# Patient Record
Sex: MALE | Race: OTHER | HISPANIC OR LATINO | Employment: FULL TIME | URBAN - METROPOLITAN AREA
[De-identification: names, ages, dates, MRNs, and addresses within clinical notes are randomized per-mention and may not be internally consistent; named-entity substitution may affect disease eponyms.]

---

## 2017-07-25 ENCOUNTER — ANESTHESIA (INPATIENT)
Dept: PERIOP | Facility: HOSPITAL | Age: 32
DRG: 482 | End: 2017-07-25
Payer: COMMERCIAL

## 2017-07-25 ENCOUNTER — APPOINTMENT (INPATIENT)
Dept: RADIOLOGY | Facility: HOSPITAL | Age: 32
DRG: 482 | End: 2017-07-25
Payer: COMMERCIAL

## 2017-07-25 ENCOUNTER — APPOINTMENT (EMERGENCY)
Dept: RADIOLOGY | Facility: HOSPITAL | Age: 32
DRG: 482 | End: 2017-07-25
Payer: COMMERCIAL

## 2017-07-25 ENCOUNTER — HOSPITAL ENCOUNTER (INPATIENT)
Facility: HOSPITAL | Age: 32
LOS: 2 days | Discharge: HOME | DRG: 482 | End: 2017-07-27
Attending: EMERGENCY MEDICINE | Admitting: ORTHOPAEDIC SURGERY
Payer: COMMERCIAL

## 2017-07-25 ENCOUNTER — ANESTHESIA EVENT (INPATIENT)
Dept: PERIOP | Facility: HOSPITAL | Age: 32
DRG: 482 | End: 2017-07-25
Payer: COMMERCIAL

## 2017-07-25 DIAGNOSIS — S72.001A CLOSED DISPLACED FRACTURE OF RIGHT FEMORAL NECK, INITIAL ENCOUNTER: Primary | ICD-10-CM

## 2017-07-25 PROBLEM — S72.009A FEMORAL NECK FRACTURE (HCC): Status: ACTIVE | Noted: 2017-07-25

## 2017-07-25 LAB
ABO GROUP BLD: NORMAL
ANION GAP SERPL CALCULATED.3IONS-SCNC: 6 MMOL/L (ref 4–13)
APTT PPP: 27 SECONDS (ref 23–35)
BASOPHILS # BLD AUTO: 0.01 THOUSANDS/ΜL (ref 0–0.1)
BASOPHILS NFR BLD AUTO: 0 % (ref 0–1)
BLD GP AB SCN SERPL QL: NEGATIVE
BUN SERPL-MCNC: 8 MG/DL (ref 5–25)
CALCIUM SERPL-MCNC: 8.5 MG/DL (ref 8.3–10.1)
CHLORIDE SERPL-SCNC: 104 MMOL/L (ref 100–108)
CO2 SERPL-SCNC: 24 MMOL/L (ref 21–32)
CREAT SERPL-MCNC: 1.07 MG/DL (ref 0.6–1.3)
EOSINOPHIL # BLD AUTO: 0.21 THOUSAND/ΜL (ref 0–0.61)
EOSINOPHIL NFR BLD AUTO: 2 % (ref 0–6)
ERYTHROCYTE [DISTWIDTH] IN BLOOD BY AUTOMATED COUNT: 13.2 % (ref 11.6–15.1)
GFR SERPL CREATININE-BSD FRML MDRD: 91 ML/MIN/1.73SQ M
GLUCOSE SERPL-MCNC: 141 MG/DL (ref 65–140)
HCT VFR BLD AUTO: 42.5 % (ref 36.5–49.3)
HGB BLD-MCNC: 14.8 G/DL (ref 12–17)
HOLD SPECIMEN: NORMAL
HOLD SPECIMEN: NORMAL
INR PPP: 1.02 (ref 0.86–1.16)
LYMPHOCYTES # BLD AUTO: 2.48 THOUSANDS/ΜL (ref 0.6–4.47)
LYMPHOCYTES NFR BLD AUTO: 27 % (ref 14–44)
MCH RBC QN AUTO: 34.1 PG (ref 26.8–34.3)
MCHC RBC AUTO-ENTMCNC: 34.8 G/DL (ref 31.4–37.4)
MCV RBC AUTO: 98 FL (ref 82–98)
MONOCYTES # BLD AUTO: 0.97 THOUSAND/ΜL (ref 0.17–1.22)
MONOCYTES NFR BLD AUTO: 11 % (ref 4–12)
NEUTROPHILS # BLD AUTO: 5.39 THOUSANDS/ΜL (ref 1.85–7.62)
NEUTS SEG NFR BLD AUTO: 60 % (ref 43–75)
NRBC BLD AUTO-RTO: 0 /100 WBCS
PLATELET # BLD AUTO: 203 THOUSANDS/UL (ref 149–390)
PMV BLD AUTO: 9.8 FL (ref 8.9–12.7)
POTASSIUM SERPL-SCNC: 3.8 MMOL/L (ref 3.5–5.3)
PROTHROMBIN TIME: 13.4 SECONDS (ref 12.1–14.4)
RBC # BLD AUTO: 4.34 MILLION/UL (ref 3.88–5.62)
RH BLD: POSITIVE
SODIUM SERPL-SCNC: 134 MMOL/L (ref 136–145)
SPECIMEN EXPIRATION DATE: NORMAL
WBC # BLD AUTO: 9.07 THOUSAND/UL (ref 4.31–10.16)

## 2017-07-25 PROCEDURE — C1713 ANCHOR/SCREW BN/BN,TIS/BN: HCPCS | Performed by: ORTHOPAEDIC SURGERY

## 2017-07-25 PROCEDURE — C1769 GUIDE WIRE: HCPCS | Performed by: ORTHOPAEDIC SURGERY

## 2017-07-25 PROCEDURE — 96374 THER/PROPH/DIAG INJ IV PUSH: CPT

## 2017-07-25 PROCEDURE — 0QS604Z REPOSITION RIGHT UPPER FEMUR WITH INTERNAL FIXATION DEVICE, OPEN APPROACH: ICD-10-PCS | Performed by: ORTHOPAEDIC SURGERY

## 2017-07-25 PROCEDURE — 96375 TX/PRO/DX INJ NEW DRUG ADDON: CPT

## 2017-07-25 PROCEDURE — 85025 COMPLETE CBC W/AUTO DIFF WBC: CPT | Performed by: EMERGENCY MEDICINE

## 2017-07-25 PROCEDURE — 96376 TX/PRO/DX INJ SAME DRUG ADON: CPT

## 2017-07-25 PROCEDURE — 86901 BLOOD TYPING SEROLOGIC RH(D): CPT | Performed by: EMERGENCY MEDICINE

## 2017-07-25 PROCEDURE — 73502 X-RAY EXAM HIP UNI 2-3 VIEWS: CPT

## 2017-07-25 PROCEDURE — 36415 COLL VENOUS BLD VENIPUNCTURE: CPT | Performed by: EMERGENCY MEDICINE

## 2017-07-25 PROCEDURE — 86900 BLOOD TYPING SEROLOGIC ABO: CPT | Performed by: EMERGENCY MEDICINE

## 2017-07-25 PROCEDURE — 86920 COMPATIBILITY TEST SPIN: CPT

## 2017-07-25 PROCEDURE — 85730 THROMBOPLASTIN TIME PARTIAL: CPT | Performed by: ORTHOPAEDIC SURGERY

## 2017-07-25 PROCEDURE — 73700 CT LOWER EXTREMITY W/O DYE: CPT

## 2017-07-25 PROCEDURE — 99285 EMERGENCY DEPT VISIT HI MDM: CPT

## 2017-07-25 PROCEDURE — 96361 HYDRATE IV INFUSION ADD-ON: CPT

## 2017-07-25 PROCEDURE — 85610 PROTHROMBIN TIME: CPT | Performed by: ORTHOPAEDIC SURGERY

## 2017-07-25 PROCEDURE — 80048 BASIC METABOLIC PNL TOTAL CA: CPT | Performed by: EMERGENCY MEDICINE

## 2017-07-25 PROCEDURE — 86850 RBC ANTIBODY SCREEN: CPT | Performed by: EMERGENCY MEDICINE

## 2017-07-25 PROCEDURE — 73560 X-RAY EXAM OF KNEE 1 OR 2: CPT

## 2017-07-25 DEVICE — 4.5MM CORTEX SCREW SELF-TAPPING 42MM: Type: IMPLANTABLE DEVICE | Status: FUNCTIONAL

## 2017-07-25 DEVICE — 130 DEG DHS PLATE-STD BARREL 2 HOLES/46MM-STERILE
Type: IMPLANTABLE DEVICE | Status: FUNCTIONAL
Brand: DHS

## 2017-07-25 DEVICE — DHS®/DCS® ONE-STEP LAG SCREW 12.7MM THREAD/95MM-STERILE
Type: IMPLANTABLE DEVICE | Status: FUNCTIONAL
Brand: DHS

## 2017-07-25 DEVICE — 6.5MM CANNULATED SCREW 16MM THREAD 85MM
Type: IMPLANTABLE DEVICE | Status: NON-FUNCTIONAL
Removed: 2018-01-22

## 2017-07-25 RX ORDER — SODIUM CHLORIDE, SODIUM LACTATE, POTASSIUM CHLORIDE, CALCIUM CHLORIDE 600; 310; 30; 20 MG/100ML; MG/100ML; MG/100ML; MG/100ML
INJECTION, SOLUTION INTRAVENOUS CONTINUOUS PRN
Status: DISCONTINUED | OUTPATIENT
Start: 2017-07-25 | End: 2017-07-25 | Stop reason: SURG

## 2017-07-25 RX ORDER — PROPOFOL 10 MG/ML
INJECTION, EMULSION INTRAVENOUS AS NEEDED
Status: DISCONTINUED | OUTPATIENT
Start: 2017-07-25 | End: 2017-07-25 | Stop reason: SURG

## 2017-07-25 RX ORDER — DIAZEPAM 5 MG/ML
2.5 INJECTION, SOLUTION INTRAMUSCULAR; INTRAVENOUS ONCE
Status: COMPLETED | OUTPATIENT
Start: 2017-07-25 | End: 2017-07-25

## 2017-07-25 RX ORDER — ONDANSETRON 2 MG/ML
4 INJECTION INTRAMUSCULAR; INTRAVENOUS EVERY 6 HOURS PRN
Status: DISCONTINUED | OUTPATIENT
Start: 2017-07-25 | End: 2017-07-27 | Stop reason: HOSPADM

## 2017-07-25 RX ORDER — OXYCODONE HYDROCHLORIDE 5 MG/1
5 TABLET ORAL EVERY 4 HOURS PRN
Status: DISCONTINUED | OUTPATIENT
Start: 2017-07-25 | End: 2017-07-26

## 2017-07-25 RX ORDER — CLONAZEPAM 0.5 MG/1
0.5 TABLET ORAL DAILY
Status: DISCONTINUED | OUTPATIENT
Start: 2017-07-26 | End: 2017-07-27 | Stop reason: HOSPADM

## 2017-07-25 RX ORDER — MAGNESIUM HYDROXIDE 1200 MG/15ML
LIQUID ORAL AS NEEDED
Status: DISCONTINUED | OUTPATIENT
Start: 2017-07-25 | End: 2017-07-25 | Stop reason: HOSPADM

## 2017-07-25 RX ORDER — ONDANSETRON 2 MG/ML
INJECTION INTRAMUSCULAR; INTRAVENOUS AS NEEDED
Status: DISCONTINUED | OUTPATIENT
Start: 2017-07-25 | End: 2017-07-25 | Stop reason: SURG

## 2017-07-25 RX ORDER — SENNOSIDES 8.6 MG
1 TABLET ORAL DAILY
Status: DISCONTINUED | OUTPATIENT
Start: 2017-07-26 | End: 2017-07-27 | Stop reason: HOSPADM

## 2017-07-25 RX ORDER — CITALOPRAM 40 MG/1
40 TABLET ORAL DAILY
COMMUNITY
End: 2018-01-25

## 2017-07-25 RX ORDER — ACETAMINOPHEN 325 MG/1
650 TABLET ORAL EVERY 4 HOURS PRN
Status: DISCONTINUED | OUTPATIENT
Start: 2017-07-25 | End: 2017-07-26

## 2017-07-25 RX ORDER — ONDANSETRON 2 MG/ML
4 INJECTION INTRAMUSCULAR; INTRAVENOUS ONCE AS NEEDED
Status: DISCONTINUED | OUTPATIENT
Start: 2017-07-25 | End: 2017-07-25 | Stop reason: HOSPADM

## 2017-07-25 RX ORDER — MIDAZOLAM HYDROCHLORIDE 1 MG/ML
INJECTION INTRAMUSCULAR; INTRAVENOUS AS NEEDED
Status: DISCONTINUED | OUTPATIENT
Start: 2017-07-25 | End: 2017-07-25 | Stop reason: SURG

## 2017-07-25 RX ORDER — ROCURONIUM BROMIDE 10 MG/ML
INJECTION, SOLUTION INTRAVENOUS AS NEEDED
Status: DISCONTINUED | OUTPATIENT
Start: 2017-07-25 | End: 2017-07-25 | Stop reason: SURG

## 2017-07-25 RX ORDER — FENTANYL CITRATE 50 UG/ML
INJECTION, SOLUTION INTRAMUSCULAR; INTRAVENOUS AS NEEDED
Status: DISCONTINUED | OUTPATIENT
Start: 2017-07-25 | End: 2017-07-25 | Stop reason: SURG

## 2017-07-25 RX ORDER — OXYCODONE HYDROCHLORIDE 10 MG/1
10 TABLET ORAL EVERY 4 HOURS PRN
Status: DISCONTINUED | OUTPATIENT
Start: 2017-07-25 | End: 2017-07-26

## 2017-07-25 RX ORDER — LIDOCAINE HYDROCHLORIDE 10 MG/ML
INJECTION, SOLUTION INFILTRATION; PERINEURAL AS NEEDED
Status: DISCONTINUED | OUTPATIENT
Start: 2017-07-25 | End: 2017-07-25 | Stop reason: SURG

## 2017-07-25 RX ORDER — FENTANYL CITRATE 50 UG/ML
100 INJECTION, SOLUTION INTRAMUSCULAR; INTRAVENOUS ONCE
Status: COMPLETED | OUTPATIENT
Start: 2017-07-25 | End: 2017-07-25

## 2017-07-25 RX ORDER — SODIUM CHLORIDE, SODIUM LACTATE, POTASSIUM CHLORIDE, CALCIUM CHLORIDE 600; 310; 30; 20 MG/100ML; MG/100ML; MG/100ML; MG/100ML
100 INJECTION, SOLUTION INTRAVENOUS CONTINUOUS
Status: DISCONTINUED | OUTPATIENT
Start: 2017-07-25 | End: 2017-07-27 | Stop reason: HOSPADM

## 2017-07-25 RX ORDER — DOCUSATE SODIUM 100 MG/1
100 CAPSULE, LIQUID FILLED ORAL 2 TIMES DAILY
Status: DISCONTINUED | OUTPATIENT
Start: 2017-07-25 | End: 2017-07-27 | Stop reason: HOSPADM

## 2017-07-25 RX ORDER — CITALOPRAM 20 MG/1
40 TABLET ORAL DAILY
Status: DISCONTINUED | OUTPATIENT
Start: 2017-07-26 | End: 2017-07-27 | Stop reason: HOSPADM

## 2017-07-25 RX ADMIN — SODIUM CHLORIDE 1000 ML: 0.9 INJECTION, SOLUTION INTRAVENOUS at 11:09

## 2017-07-25 RX ADMIN — OXYCODONE HYDROCHLORIDE 10 MG: 10 TABLET ORAL at 17:39

## 2017-07-25 RX ADMIN — ROCURONIUM BROMIDE 30 MG: 10 INJECTION, SOLUTION INTRAVENOUS at 18:33

## 2017-07-25 RX ADMIN — FENTANYL CITRATE 50 MCG: 50 INJECTION, SOLUTION INTRAMUSCULAR; INTRAVENOUS at 19:11

## 2017-07-25 RX ADMIN — FENTANYL CITRATE 50 MCG: 50 INJECTION, SOLUTION INTRAMUSCULAR; INTRAVENOUS at 20:22

## 2017-07-25 RX ADMIN — SODIUM CHLORIDE, SODIUM LACTATE, POTASSIUM CHLORIDE, AND CALCIUM CHLORIDE: .6; .31; .03; .02 INJECTION, SOLUTION INTRAVENOUS at 18:26

## 2017-07-25 RX ADMIN — LIDOCAINE HYDROCHLORIDE 100 MG: 10 INJECTION, SOLUTION INFILTRATION; PERINEURAL at 18:33

## 2017-07-25 RX ADMIN — HYDROMORPHONE HYDROCHLORIDE 0.5 MG: 1 INJECTION, SOLUTION INTRAMUSCULAR; INTRAVENOUS; SUBCUTANEOUS at 12:50

## 2017-07-25 RX ADMIN — ONDANSETRON 4 MG: 2 INJECTION INTRAMUSCULAR; INTRAVENOUS at 20:59

## 2017-07-25 RX ADMIN — HYDROMORPHONE HYDROCHLORIDE 0.2 MG: 1 INJECTION, SOLUTION INTRAMUSCULAR; INTRAVENOUS; SUBCUTANEOUS at 22:29

## 2017-07-25 RX ADMIN — HYDROMORPHONE HYDROCHLORIDE 0.2 MG: 1 INJECTION, SOLUTION INTRAMUSCULAR; INTRAVENOUS; SUBCUTANEOUS at 22:04

## 2017-07-25 RX ADMIN — SODIUM CHLORIDE, SODIUM LACTATE, POTASSIUM CHLORIDE, AND CALCIUM CHLORIDE 100 ML/HR: .6; .31; .03; .02 INJECTION, SOLUTION INTRAVENOUS at 17:01

## 2017-07-25 RX ADMIN — HYDROMORPHONE HYDROCHLORIDE 0.5 MG: 1 INJECTION, SOLUTION INTRAMUSCULAR; INTRAVENOUS; SUBCUTANEOUS at 22:32

## 2017-07-25 RX ADMIN — FENTANYL CITRATE 50 MCG: 50 INJECTION, SOLUTION INTRAMUSCULAR; INTRAVENOUS at 21:17

## 2017-07-25 RX ADMIN — HYDROMORPHONE HYDROCHLORIDE 0.5 MG: 1 INJECTION, SOLUTION INTRAMUSCULAR; INTRAVENOUS; SUBCUTANEOUS at 11:51

## 2017-07-25 RX ADMIN — HYDROMORPHONE HYDROCHLORIDE 0.2 MG: 1 INJECTION, SOLUTION INTRAMUSCULAR; INTRAVENOUS; SUBCUTANEOUS at 22:17

## 2017-07-25 RX ADMIN — PROPOFOL 200 MG: 10 INJECTION, EMULSION INTRAVENOUS at 18:33

## 2017-07-25 RX ADMIN — HYDROMORPHONE HYDROCHLORIDE 0.2 MG: 1 INJECTION, SOLUTION INTRAMUSCULAR; INTRAVENOUS; SUBCUTANEOUS at 22:23

## 2017-07-25 RX ADMIN — HYDROMORPHONE HYDROCHLORIDE 1 MG: 1 INJECTION, SOLUTION INTRAMUSCULAR; INTRAVENOUS; SUBCUTANEOUS at 11:07

## 2017-07-25 RX ADMIN — MIDAZOLAM HYDROCHLORIDE 2 MG: 1 INJECTION, SOLUTION INTRAMUSCULAR; INTRAVENOUS at 18:26

## 2017-07-25 RX ADMIN — FENTANYL CITRATE 100 MCG: 50 INJECTION, SOLUTION INTRAMUSCULAR; INTRAVENOUS at 18:33

## 2017-07-25 RX ADMIN — ROCURONIUM BROMIDE 20 MG: 10 INJECTION, SOLUTION INTRAVENOUS at 19:11

## 2017-07-25 RX ADMIN — FENTANYL CITRATE 100 MCG: 50 INJECTION INTRAMUSCULAR; INTRAVENOUS at 15:18

## 2017-07-25 RX ADMIN — SODIUM CHLORIDE, SODIUM LACTATE, POTASSIUM CHLORIDE, AND CALCIUM CHLORIDE: .6; .31; .03; .02 INJECTION, SOLUTION INTRAVENOUS at 19:51

## 2017-07-25 RX ADMIN — DIAZEPAM 2.5 MG: 5 INJECTION, SOLUTION INTRAMUSCULAR; INTRAVENOUS at 15:16

## 2017-07-25 RX ADMIN — HYDROMORPHONE HYDROCHLORIDE 0.2 MG: 1 INJECTION, SOLUTION INTRAMUSCULAR; INTRAVENOUS; SUBCUTANEOUS at 22:12

## 2017-07-25 RX ADMIN — CEFAZOLIN SODIUM 2000 MG: 1 SOLUTION INTRAVENOUS at 18:40

## 2017-07-25 RX ADMIN — DIAZEPAM 2.5 MG: 5 INJECTION, SOLUTION INTRAMUSCULAR; INTRAVENOUS at 12:48

## 2017-07-25 RX ADMIN — SODIUM CHLORIDE, SODIUM LACTATE, POTASSIUM CHLORIDE, AND CALCIUM CHLORIDE 100 ML/HR: .6; .31; .03; .02 INJECTION, SOLUTION INTRAVENOUS at 22:24

## 2017-07-26 LAB
ANION GAP SERPL CALCULATED.3IONS-SCNC: 5 MMOL/L (ref 4–13)
BUN SERPL-MCNC: 7 MG/DL (ref 5–25)
CALCIUM SERPL-MCNC: 8.6 MG/DL (ref 8.3–10.1)
CHLORIDE SERPL-SCNC: 103 MMOL/L (ref 100–108)
CO2 SERPL-SCNC: 27 MMOL/L (ref 21–32)
CREAT SERPL-MCNC: 0.87 MG/DL (ref 0.6–1.3)
ERYTHROCYTE [DISTWIDTH] IN BLOOD BY AUTOMATED COUNT: 13.1 % (ref 11.6–15.1)
GFR SERPL CREATININE-BSD FRML MDRD: 114 ML/MIN/1.73SQ M
GLUCOSE SERPL-MCNC: 108 MG/DL (ref 65–140)
HCT VFR BLD AUTO: 39.7 % (ref 36.5–49.3)
HGB BLD-MCNC: 13.8 G/DL (ref 12–17)
MCH RBC QN AUTO: 33.8 PG (ref 26.8–34.3)
MCHC RBC AUTO-ENTMCNC: 34.8 G/DL (ref 31.4–37.4)
MCV RBC AUTO: 97 FL (ref 82–98)
PLATELET # BLD AUTO: 195 THOUSANDS/UL (ref 149–390)
PMV BLD AUTO: 9.8 FL (ref 8.9–12.7)
POTASSIUM SERPL-SCNC: 4.2 MMOL/L (ref 3.5–5.3)
RBC # BLD AUTO: 4.08 MILLION/UL (ref 3.88–5.62)
SODIUM SERPL-SCNC: 135 MMOL/L (ref 136–145)
WBC # BLD AUTO: 9.19 THOUSAND/UL (ref 4.31–10.16)

## 2017-07-26 PROCEDURE — G8978 MOBILITY CURRENT STATUS: HCPCS

## 2017-07-26 PROCEDURE — 97167 OT EVAL HIGH COMPLEX 60 MIN: CPT

## 2017-07-26 PROCEDURE — 80048 BASIC METABOLIC PNL TOTAL CA: CPT | Performed by: ORTHOPAEDIC SURGERY

## 2017-07-26 PROCEDURE — G8988 SELF CARE GOAL STATUS: HCPCS

## 2017-07-26 PROCEDURE — G8987 SELF CARE CURRENT STATUS: HCPCS

## 2017-07-26 PROCEDURE — 97163 PT EVAL HIGH COMPLEX 45 MIN: CPT

## 2017-07-26 PROCEDURE — G8989 SELF CARE D/C STATUS: HCPCS

## 2017-07-26 PROCEDURE — 85027 COMPLETE CBC AUTOMATED: CPT | Performed by: ORTHOPAEDIC SURGERY

## 2017-07-26 PROCEDURE — G8980 MOBILITY D/C STATUS: HCPCS

## 2017-07-26 PROCEDURE — G8979 MOBILITY GOAL STATUS: HCPCS

## 2017-07-26 RX ORDER — METHOCARBAMOL 500 MG/1
500 TABLET, FILM COATED ORAL EVERY 6 HOURS
Status: DISCONTINUED | OUTPATIENT
Start: 2017-07-26 | End: 2017-07-27 | Stop reason: HOSPADM

## 2017-07-26 RX ORDER — OXYCODONE HYDROCHLORIDE 10 MG/1
10 TABLET ORAL EVERY 4 HOURS PRN
Status: DISCONTINUED | OUTPATIENT
Start: 2017-07-26 | End: 2017-07-27

## 2017-07-26 RX ORDER — ACETAMINOPHEN 325 MG/1
975 TABLET ORAL EVERY 6 HOURS SCHEDULED
Status: DISCONTINUED | OUTPATIENT
Start: 2017-07-26 | End: 2017-07-27

## 2017-07-26 RX ORDER — GABAPENTIN 100 MG/1
100 CAPSULE ORAL 3 TIMES DAILY
Status: DISCONTINUED | OUTPATIENT
Start: 2017-07-26 | End: 2017-07-27 | Stop reason: HOSPADM

## 2017-07-26 RX ORDER — ACETAMINOPHEN 325 MG/1
975 TABLET ORAL EVERY 4 HOURS
Status: DISCONTINUED | OUTPATIENT
Start: 2017-07-26 | End: 2017-07-26

## 2017-07-26 RX ORDER — OXYCODONE HYDROCHLORIDE 5 MG/1
5 TABLET ORAL EVERY 4 HOURS PRN
Qty: 30 TABLET | Refills: 0
Start: 2017-07-26 | End: 2017-07-27 | Stop reason: HOSPADM

## 2017-07-26 RX ADMIN — HYDROMORPHONE HYDROCHLORIDE 1 MG: 1 INJECTION, SOLUTION INTRAMUSCULAR; INTRAVENOUS; SUBCUTANEOUS at 15:23

## 2017-07-26 RX ADMIN — OXYCODONE HYDROCHLORIDE 5 MG: 5 TABLET ORAL at 03:12

## 2017-07-26 RX ADMIN — CITALOPRAM HYDROBROMIDE 40 MG: 20 TABLET ORAL at 08:11

## 2017-07-26 RX ADMIN — HYDROMORPHONE HYDROCHLORIDE 1 MG: 1 INJECTION, SOLUTION INTRAMUSCULAR; INTRAVENOUS; SUBCUTANEOUS at 19:46

## 2017-07-26 RX ADMIN — CLONAZEPAM 0.5 MG: 0.5 TABLET ORAL at 08:11

## 2017-07-26 RX ADMIN — SENNOSIDES 8.6 MG: 8.6 TABLET, FILM COATED ORAL at 08:11

## 2017-07-26 RX ADMIN — HYDROMORPHONE HYDROCHLORIDE 1 MG: 1 INJECTION, SOLUTION INTRAMUSCULAR; INTRAVENOUS; SUBCUTANEOUS at 13:02

## 2017-07-26 RX ADMIN — ACETAMINOPHEN 650 MG: 325 TABLET, FILM COATED ORAL at 01:35

## 2017-07-26 RX ADMIN — ACETAMINOPHEN 975 MG: 325 TABLET, FILM COATED ORAL at 23:50

## 2017-07-26 RX ADMIN — METHOCARBAMOL 500 MG: 500 TABLET ORAL at 15:23

## 2017-07-26 RX ADMIN — ACETAMINOPHEN 650 MG: 325 TABLET, FILM COATED ORAL at 08:27

## 2017-07-26 RX ADMIN — DOCUSATE SODIUM 100 MG: 100 CAPSULE, LIQUID FILLED ORAL at 18:12

## 2017-07-26 RX ADMIN — HYDROMORPHONE HYDROCHLORIDE 1 MG: 1 INJECTION, SOLUTION INTRAMUSCULAR; INTRAVENOUS; SUBCUTANEOUS at 23:51

## 2017-07-26 RX ADMIN — DOCUSATE SODIUM 100 MG: 100 CAPSULE, LIQUID FILLED ORAL at 08:11

## 2017-07-26 RX ADMIN — CEFAZOLIN SODIUM 1000 MG: 1 SOLUTION INTRAVENOUS at 00:49

## 2017-07-26 RX ADMIN — OXYCODONE HYDROCHLORIDE 15 MG: 5 TABLET ORAL at 11:50

## 2017-07-26 RX ADMIN — NICOTINE 1 PATCH: 7 PATCH, EXTENDED RELEASE TRANSDERMAL at 08:10

## 2017-07-26 RX ADMIN — ENOXAPARIN SODIUM 40 MG: 40 INJECTION SUBCUTANEOUS at 08:11

## 2017-07-26 RX ADMIN — OXYCODONE HYDROCHLORIDE 15 MG: 5 TABLET ORAL at 18:12

## 2017-07-26 RX ADMIN — GABAPENTIN 100 MG: 100 CAPSULE ORAL at 22:06

## 2017-07-26 RX ADMIN — HYDROMORPHONE HYDROCHLORIDE 0.5 MG: 1 INJECTION, SOLUTION INTRAMUSCULAR; INTRAVENOUS; SUBCUTANEOUS at 08:25

## 2017-07-26 RX ADMIN — HYDROMORPHONE HYDROCHLORIDE 0.5 MG: 1 INJECTION, SOLUTION INTRAMUSCULAR; INTRAVENOUS; SUBCUTANEOUS at 05:26

## 2017-07-26 RX ADMIN — CEFAZOLIN SODIUM 1000 MG: 1 SOLUTION INTRAVENOUS at 07:33

## 2017-07-26 RX ADMIN — ACETAMINOPHEN 975 MG: 325 TABLET, FILM COATED ORAL at 15:24

## 2017-07-26 RX ADMIN — HYDROMORPHONE HYDROCHLORIDE 1 MG: 1 INJECTION, SOLUTION INTRAMUSCULAR; INTRAVENOUS; SUBCUTANEOUS at 09:50

## 2017-07-26 RX ADMIN — GABAPENTIN 100 MG: 100 CAPSULE ORAL at 15:23

## 2017-07-26 RX ADMIN — OXYCODONE HYDROCHLORIDE 10 MG: 10 TABLET ORAL at 07:40

## 2017-07-26 RX ADMIN — OXYCODONE HYDROCHLORIDE 10 MG: 10 TABLET ORAL at 22:07

## 2017-07-26 RX ADMIN — HYDROMORPHONE HYDROCHLORIDE 0.5 MG: 1 INJECTION, SOLUTION INTRAMUSCULAR; INTRAVENOUS; SUBCUTANEOUS at 01:12

## 2017-07-26 RX ADMIN — METHOCARBAMOL 500 MG: 500 TABLET ORAL at 22:06

## 2017-07-27 ENCOUNTER — GENERIC CONVERSION - ENCOUNTER (OUTPATIENT)
Dept: OTHER | Facility: OTHER | Age: 32
End: 2017-07-27

## 2017-07-27 VITALS
OXYGEN SATURATION: 96 % | RESPIRATION RATE: 18 BRPM | TEMPERATURE: 98.3 F | WEIGHT: 200 LBS | HEIGHT: 67 IN | SYSTOLIC BLOOD PRESSURE: 107 MMHG | HEART RATE: 80 BPM | BODY MASS INDEX: 31.39 KG/M2 | DIASTOLIC BLOOD PRESSURE: 59 MMHG

## 2017-07-27 LAB
ANION GAP SERPL CALCULATED.3IONS-SCNC: 6 MMOL/L (ref 4–13)
BUN SERPL-MCNC: 6 MG/DL (ref 5–25)
CALCIUM SERPL-MCNC: 8.5 MG/DL (ref 8.3–10.1)
CHLORIDE SERPL-SCNC: 102 MMOL/L (ref 100–108)
CO2 SERPL-SCNC: 27 MMOL/L (ref 21–32)
CREAT SERPL-MCNC: 0.87 MG/DL (ref 0.6–1.3)
ERYTHROCYTE [DISTWIDTH] IN BLOOD BY AUTOMATED COUNT: 13.4 % (ref 11.6–15.1)
GFR SERPL CREATININE-BSD FRML MDRD: 114 ML/MIN/1.73SQ M
GLUCOSE SERPL-MCNC: 128 MG/DL (ref 65–140)
HCT VFR BLD AUTO: 38.2 % (ref 36.5–49.3)
HGB BLD-MCNC: 12.9 G/DL (ref 12–17)
MCH RBC QN AUTO: 33.2 PG (ref 26.8–34.3)
MCHC RBC AUTO-ENTMCNC: 33.8 G/DL (ref 31.4–37.4)
MCV RBC AUTO: 99 FL (ref 82–98)
PLATELET # BLD AUTO: 174 THOUSANDS/UL (ref 149–390)
PMV BLD AUTO: 9.9 FL (ref 8.9–12.7)
POTASSIUM SERPL-SCNC: 3.5 MMOL/L (ref 3.5–5.3)
RBC # BLD AUTO: 3.88 MILLION/UL (ref 3.88–5.62)
SODIUM SERPL-SCNC: 135 MMOL/L (ref 136–145)
WBC # BLD AUTO: 9.04 THOUSAND/UL (ref 4.31–10.16)

## 2017-07-27 PROCEDURE — 85027 COMPLETE CBC AUTOMATED: CPT | Performed by: ORTHOPAEDIC SURGERY

## 2017-07-27 PROCEDURE — 80048 BASIC METABOLIC PNL TOTAL CA: CPT | Performed by: ORTHOPAEDIC SURGERY

## 2017-07-27 RX ORDER — ACETAMINOPHEN 325 MG/1
TABLET ORAL
Qty: 30 TABLET | Refills: 0
Start: 2017-07-27 | End: 2017-07-27 | Stop reason: HOSPADM

## 2017-07-27 RX ORDER — ACETAMINOPHEN 325 MG/1
975 TABLET ORAL EVERY 6 HOURS SCHEDULED
Status: DISCONTINUED | OUTPATIENT
Start: 2017-07-27 | End: 2017-07-27 | Stop reason: HOSPADM

## 2017-07-27 RX ORDER — ACETAMINOPHEN 325 MG/1
TABLET ORAL
Qty: 30 TABLET | Refills: 0
Start: 2017-07-27 | End: 2017-07-27

## 2017-07-27 RX ORDER — CEPHALEXIN 500 MG/1
500 CAPSULE ORAL EVERY 6 HOURS SCHEDULED
Status: DISCONTINUED | OUTPATIENT
Start: 2017-07-27 | End: 2017-07-27 | Stop reason: HOSPADM

## 2017-07-27 RX ORDER — TRAMADOL HYDROCHLORIDE 50 MG/1
50 TABLET ORAL EVERY 6 HOURS PRN
Qty: 30 TABLET | Refills: 0
Start: 2017-07-27 | End: 2017-07-27 | Stop reason: HOSPADM

## 2017-07-27 RX ORDER — CEPHALEXIN 500 MG/1
500 CAPSULE ORAL EVERY 6 HOURS SCHEDULED
Qty: 20 CAPSULE | Refills: 0 | Status: SHIPPED | OUTPATIENT
Start: 2017-07-27 | End: 2017-07-27

## 2017-07-27 RX ORDER — METHOCARBAMOL 500 MG/1
500 TABLET, FILM COATED ORAL EVERY 6 HOURS
Refills: 0 | Status: ON HOLD
Start: 2017-07-27 | End: 2018-01-22

## 2017-07-27 RX ORDER — TRAMADOL HYDROCHLORIDE 50 MG/1
50 TABLET ORAL EVERY 6 HOURS PRN
Status: DISCONTINUED | OUTPATIENT
Start: 2017-07-27 | End: 2017-07-27 | Stop reason: HOSPADM

## 2017-07-27 RX ORDER — CEPHALEXIN 500 MG/1
500 CAPSULE ORAL EVERY 6 HOURS SCHEDULED
Qty: 20 CAPSULE | Refills: 0
Start: 2017-07-27 | End: 2017-08-01

## 2017-07-27 RX ORDER — POLYETHYLENE GLYCOL 3350 17 G/17G
17 POWDER, FOR SOLUTION ORAL DAILY
Status: DISCONTINUED | OUTPATIENT
Start: 2017-07-27 | End: 2017-07-27 | Stop reason: HOSPADM

## 2017-07-27 RX ADMIN — ENOXAPARIN SODIUM 40 MG: 40 INJECTION SUBCUTANEOUS at 08:16

## 2017-07-27 RX ADMIN — ACETAMINOPHEN 975 MG: 325 TABLET, FILM COATED ORAL at 05:34

## 2017-07-27 RX ADMIN — CEPHALEXIN 500 MG: 500 CAPSULE ORAL at 12:39

## 2017-07-27 RX ADMIN — GABAPENTIN 100 MG: 100 CAPSULE ORAL at 08:16

## 2017-07-27 RX ADMIN — METHOCARBAMOL 500 MG: 500 TABLET ORAL at 04:39

## 2017-07-27 RX ADMIN — OXYCODONE HYDROCHLORIDE 15 MG: 5 TABLET ORAL at 08:17

## 2017-07-27 RX ADMIN — SENNOSIDES 8.6 MG: 8.6 TABLET, FILM COATED ORAL at 08:16

## 2017-07-27 RX ADMIN — HYDROMORPHONE HYDROCHLORIDE 1 MG: 1 INJECTION, SOLUTION INTRAMUSCULAR; INTRAVENOUS; SUBCUTANEOUS at 13:54

## 2017-07-27 RX ADMIN — CLONAZEPAM 0.5 MG: 0.5 TABLET ORAL at 08:17

## 2017-07-27 RX ADMIN — OXYCODONE HYDROCHLORIDE 15 MG: 5 TABLET ORAL at 03:33

## 2017-07-27 RX ADMIN — NICOTINE 1 PATCH: 7 PATCH, EXTENDED RELEASE TRANSDERMAL at 08:16

## 2017-07-27 RX ADMIN — METHOCARBAMOL 500 MG: 500 TABLET ORAL at 10:45

## 2017-07-27 RX ADMIN — HYDROMORPHONE HYDROCHLORIDE 1 MG: 1 INJECTION, SOLUTION INTRAMUSCULAR; INTRAVENOUS; SUBCUTANEOUS at 04:39

## 2017-07-27 RX ADMIN — POLYETHYLENE GLYCOL 3350 17 G: 17 POWDER, FOR SOLUTION ORAL at 10:45

## 2017-07-27 RX ADMIN — DOCUSATE SODIUM 100 MG: 100 CAPSULE, LIQUID FILLED ORAL at 08:16

## 2017-07-27 RX ADMIN — CITALOPRAM HYDROBROMIDE 40 MG: 20 TABLET ORAL at 08:16

## 2017-07-27 RX ADMIN — HYDROMORPHONE HYDROCHLORIDE 1 MG: 1 INJECTION, SOLUTION INTRAMUSCULAR; INTRAVENOUS; SUBCUTANEOUS at 10:45

## 2017-07-28 LAB
ABO GROUP BLD BPU: NORMAL
ABO GROUP BLD BPU: NORMAL
BPU ID: NORMAL
BPU ID: NORMAL
CROSSMATCH: NORMAL
CROSSMATCH: NORMAL
UNIT DISPENSE STATUS: NORMAL
UNIT DISPENSE STATUS: NORMAL
UNIT PRODUCT CODE: NORMAL
UNIT PRODUCT CODE: NORMAL
UNIT RH: NORMAL
UNIT RH: NORMAL

## 2017-08-03 ENCOUNTER — HOSPITAL ENCOUNTER (OUTPATIENT)
Dept: RADIOLOGY | Facility: HOSPITAL | Age: 32
Discharge: HOME | End: 2017-08-03
Attending: ORTHOPAEDIC SURGERY
Payer: COMMERCIAL

## 2017-08-03 ENCOUNTER — ALLSCRIPTS OFFICE VISIT (OUTPATIENT)
Dept: OTHER | Facility: OTHER | Age: 32
End: 2017-08-03

## 2017-08-03 ENCOUNTER — GENERIC CONVERSION - ENCOUNTER (OUTPATIENT)
Dept: OTHER | Facility: OTHER | Age: 32
End: 2017-08-03

## 2017-08-03 DIAGNOSIS — S72.001D CLOSED FRACTURE OF NECK OF RIGHT FEMUR WITH ROUTINE HEALING: ICD-10-CM

## 2017-08-03 PROCEDURE — 73502 X-RAY EXAM HIP UNI 2-3 VIEWS: CPT

## 2017-08-31 ENCOUNTER — HOSPITAL ENCOUNTER (OUTPATIENT)
Dept: RADIOLOGY | Facility: HOSPITAL | Age: 32
Discharge: HOME | End: 2017-08-31
Attending: ORTHOPAEDIC SURGERY
Payer: COMMERCIAL

## 2017-08-31 ENCOUNTER — GENERIC CONVERSION - ENCOUNTER (OUTPATIENT)
Dept: OTHER | Facility: OTHER | Age: 32
End: 2017-08-31

## 2017-08-31 DIAGNOSIS — S72.001D CLOSED FRACTURE OF NECK OF RIGHT FEMUR WITH ROUTINE HEALING: ICD-10-CM

## 2017-08-31 PROCEDURE — 73502 X-RAY EXAM HIP UNI 2-3 VIEWS: CPT

## 2017-09-21 ENCOUNTER — ALLSCRIPTS OFFICE VISIT (OUTPATIENT)
Dept: OTHER | Facility: OTHER | Age: 32
End: 2017-09-21

## 2017-09-21 ENCOUNTER — GENERIC CONVERSION - ENCOUNTER (OUTPATIENT)
Dept: OTHER | Facility: OTHER | Age: 32
End: 2017-09-21

## 2017-09-21 ENCOUNTER — HOSPITAL ENCOUNTER (OUTPATIENT)
Dept: RADIOLOGY | Facility: HOSPITAL | Age: 32
Discharge: HOME | End: 2017-09-21
Attending: ORTHOPAEDIC SURGERY
Payer: COMMERCIAL

## 2017-09-21 DIAGNOSIS — S72.001D CLOSED FRACTURE OF NECK OF RIGHT FEMUR WITH ROUTINE HEALING: ICD-10-CM

## 2017-09-21 DIAGNOSIS — Z48.89 ENCOUNTER FOR OTHER SPECIFIED SURGICAL AFTERCARE (CODE): ICD-10-CM

## 2017-09-21 PROCEDURE — 73502 X-RAY EXAM HIP UNI 2-3 VIEWS: CPT

## 2017-10-06 ENCOUNTER — GENERIC CONVERSION - ENCOUNTER (OUTPATIENT)
Dept: OTHER | Facility: OTHER | Age: 32
End: 2017-10-06

## 2017-10-26 ENCOUNTER — HOSPITAL ENCOUNTER (OUTPATIENT)
Dept: RADIOLOGY | Facility: HOSPITAL | Age: 32
Discharge: HOME | End: 2017-10-26
Attending: ORTHOPAEDIC SURGERY
Payer: COMMERCIAL

## 2017-10-26 ENCOUNTER — ALLSCRIPTS OFFICE VISIT (OUTPATIENT)
Dept: OTHER | Facility: OTHER | Age: 32
End: 2017-10-26

## 2017-10-26 DIAGNOSIS — S72.001D CLOSED FRACTURE OF NECK OF RIGHT FEMUR WITH ROUTINE HEALING: ICD-10-CM

## 2017-10-26 PROCEDURE — 73502 X-RAY EXAM HIP UNI 2-3 VIEWS: CPT

## 2017-12-07 ENCOUNTER — ALLSCRIPTS OFFICE VISIT (OUTPATIENT)
Dept: OTHER | Facility: OTHER | Age: 32
End: 2017-12-07

## 2017-12-07 ENCOUNTER — HOSPITAL ENCOUNTER (OUTPATIENT)
Dept: RADIOLOGY | Facility: HOSPITAL | Age: 32
Discharge: HOME | End: 2017-12-07
Attending: ORTHOPAEDIC SURGERY
Payer: COMMERCIAL

## 2017-12-07 DIAGNOSIS — S72.001D CLOSED FRACTURE OF NECK OF RIGHT FEMUR WITH ROUTINE HEALING: ICD-10-CM

## 2017-12-07 DIAGNOSIS — Z48.89 ENCOUNTER FOR OTHER SPECIFIED SURGICAL AFTERCARE (CODE): ICD-10-CM

## 2017-12-07 PROCEDURE — 73502 X-RAY EXAM HIP UNI 2-3 VIEWS: CPT

## 2017-12-08 NOTE — PROGRESS NOTES
Assessment   1  Aftercare following surgery for injury or trauma (V58 43) (Z48 89)    Plan   Aftercare following surgery for injury or trauma    · Physical Therapy Referral Other Co-Management  *This patient has a severe displaced    right femoral neck fracture secondary to stress fracture  He is improving  Range    of motion and Will's, while decreasing pain through physical therapy  He still needs    improvement in strength and motion and will require continued treatment for another 4-8    weeks  Status: Active  Requested for: 32Xls2315  YOU are Referring to a non- Preferred Provider : Established Patient   (MU) Care Summary provided  : Yes   · Follow-up visit in 6 weeks Evaluation and Treatment  Follow-up  Status: Hold For -    Scheduling  Requested for: 52NTV5670  Closed fracture of right hip requiring operative repair, with routine healing, subsequent    encounter    · * XR HIP/PELV 2-3 VWS RIGHT W PELVIS IF PERFORMED; Status:Active -    Retrospective By Protocol Authorization; Requested for:13Sfv3521;     Chief Complaint   1  Hip Pain    History of Present Illness   HPI: This patient is status post ORIF of femoral neck stress fracture and is doing well  He still complaining of pain and stiffness  His gait pattern and mobility are improving in therapy  He still will need more      Review of Systems        Constitutional:1  No fever or chills, feels well, no tiredness, no recent weight loss or weight gain1   Eyes:1  No complaints of red eyes, no eyesight problems1   ENT:1  no complaints of loss of hearing, no nosebleeds, no sore throat1   Cardiovascular: 1  No complaints of chest pain, no palpitations, no leg claudication or lower extremity edema1   Respiratory:1  No complaints of shortness of breath, no wheezing, no cough1   Gastrointestinal:1  No complaints of abdominal pain, no constipation, no nausea or vomiting, no diarrhea or bloody stools1         Genitourinary:1  No complaints of dysuria or incontinence, no hesitancy, no nocturia1   Musculoskeletal:1  as noted in Demetrius Heredia   Integumentary:1  No complaints of skin rash or lesion, no itching or dry skin, no skin wounds1   Neurological:1  No complaints of headache, no confusion, no numbness or tingling, no dizziness1   Psychiatric:1  No suicidal thoughts, no anxiety, no depression1   Endocrine:1  No muscle weakness, no frequent urination, no excessive thirst, no feelings of weakness1         1 Amended By: Devere Cabot; Dec 21 2017 1:00 PM EST      Active Problems   1  Aftercare following surgery for injury or trauma (V58 43) (Z48 89)  2  Closed fracture of right hip requiring operative repair, with routine healing, subsequent     encounter (V54 13) (S72 001D)    Surgical History    · History of Hip Arthroscopy     The surgical history was reviewed and updated today  1        1 Amended By: Devere Cabot; Dec 21 2017 1:00 PM EST      Family History      The family history was reviewed and updated today  1        1 Amended By: Devere Cabot; Dec 21 2017 1:00 PM EST      Social History    · Current every day smoker (305 1) (F17 200)  The social history was reviewed and updated today  1        1 Amended By: Devere Cabot; Dec 21 2017 1:00 PM EST      Current Meds   1  Citalopram Hydrobromide 40 MG Oral Tablet Recorded  2  ClonazePAM 0 5 MG Oral Tablet Recorded  3  Methocarbamol 750 MG Oral Tablet (Robaxin-750); TAKE 1 TABLET EVERY 6 HOURS     AS NEEDED; Therapy: 32BQE2642 to (Evaluate:41Lqo7868)  Requested for: 32Qaw3505; Last     Rx:57Otq4001 Ordered  4  OxyCODONE HCl - 10 MG Oral Tablet; TAKE 1 TABLET Every 3 hours PRN pain Take     minimal amount needed to control pain; Therapy: 85Zlv7651 to (Evaluate:84Ata9093); Last Rx:59Bfm6401 Ordered  5   OxyCODONE HCl - 5 MG Oral Tablet; TAKE 1 TABLET EVERY 4 TO 6 HOURS AS     NEEDED FOR PAIN;     Therapy: 78YTF8655 to (Evaluate:97Fxj0553); Last Rx:81Jci3792 Ordered  6  Percocet TABS (Oxycodone-Acetaminophen) Recorded     The medication list was reviewed and updated today  1        1 Amended By: Momo April; Dec 21 2017 1:00 PM EST      Allergies   1  No Known Drug Allergies    Vitals    Recorded: 02GHB2997 11:27AM   Heart Rate 66   Systolic 930   Diastolic 82   Height 5 ft 7 in     Physical Exam    Mild antalgic gait  Range of motion in hip is improving         Message   Return to work or school:    Aisha Mcnamara is under my professional care  He was seen in my office on 12/07/17          Weight Bearing Status: Full Weight-Bearing  Lifting limited to 10 lbs or less  Therapy   Rehabilitation Services Referral:      Patient Status:1  acute1   Diagnosis:1  Right hip, status post displaced femoral neck fracture1   Rehabilitation Services:1  evaluate and treat patient as needed1   Frequency:  3 times per week1  ,  for 6 weeks1    Please send progress report1          1 Amended By: Momo April; Dec 07 2017 12:08 PM EST      Signatures    Electronically signed by :  LAURA Angel ; Dec 21 2017  1:01PM EST                       (Author)

## 2018-01-13 NOTE — MISCELLANEOUS
Message  Return to work or school:   Chevy Delacruz is under my professional care  He was seen in my office on 10/26/17   He is able to return to work on  11/3/17    He is able to work with limitations (light duty: standing for only 1 hour straight, lifting <10 lbs)  Weight Bearing Status: Weight-Bearing As Tolerated  Start with a 4 hour work day for 2 weeks then can progress to 8 hour work days  Kartik Ward  Signatures   Electronically signed by : LAURA Vail ; Oct 31 2017  1:12PM EST                       (Author)    Electronically signed by : Kartik Ward, [facility]; Nov 17 2017  4:12PM EST                       (Author)    Electronically signed by :  LAURA Vail ; Nov 28 2017  2:14PM EST                       (Author)

## 2018-01-13 NOTE — MISCELLANEOUS
Message  Return to work or school:   Keith Blue is under my professional care  He was seen in my office on 7/25/2017    He is not able to return to work until further follow up with orthopedics      Chiquita Santoro PA-C  Signatures   Electronically signed by :  Chiquita Santoro, [facility]; Jul 31 2017  5:01PM EST                       (Author)

## 2018-01-17 NOTE — MISCELLANEOUS
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

## 2018-01-18 ENCOUNTER — HOSPITAL ENCOUNTER (OUTPATIENT)
Dept: RADIOLOGY | Facility: HOSPITAL | Age: 33
Discharge: HOME | End: 2018-01-18
Attending: ORTHOPAEDIC SURGERY
Payer: OTHER MISCELLANEOUS

## 2018-01-18 ENCOUNTER — TRANSCRIBE ORDERS (OUTPATIENT)
Dept: OBGYN CLINIC | Facility: HOSPITAL | Age: 33
End: 2018-01-18

## 2018-01-18 ENCOUNTER — ALLSCRIPTS OFFICE VISIT (OUTPATIENT)
Dept: OTHER | Facility: OTHER | Age: 33
End: 2018-01-18

## 2018-01-18 ENCOUNTER — GENERIC CONVERSION - ENCOUNTER (OUTPATIENT)
Dept: OTHER | Facility: OTHER | Age: 33
End: 2018-01-18

## 2018-01-18 DIAGNOSIS — Z48.89 ENCOUNTER FOR OTHER SPECIFIED SURGICAL AFTERCARE (CODE): ICD-10-CM

## 2018-01-18 PROCEDURE — 73502 X-RAY EXAM HIP UNI 2-3 VIEWS: CPT

## 2018-01-22 ENCOUNTER — ANESTHESIA (OUTPATIENT)
Dept: PERIOP | Facility: HOSPITAL | Age: 33
End: 2018-01-22
Payer: OTHER MISCELLANEOUS

## 2018-01-22 ENCOUNTER — HOSPITAL ENCOUNTER (OUTPATIENT)
Dept: RADIOLOGY | Facility: HOSPITAL | Age: 33
Setting detail: OUTPATIENT SURGERY
Discharge: HOME | End: 2018-01-22
Payer: OTHER MISCELLANEOUS

## 2018-01-22 ENCOUNTER — ANESTHESIA EVENT (OUTPATIENT)
Dept: PERIOP | Facility: HOSPITAL | Age: 33
End: 2018-01-22
Payer: OTHER MISCELLANEOUS

## 2018-01-22 ENCOUNTER — HOSPITAL ENCOUNTER (OUTPATIENT)
Facility: HOSPITAL | Age: 33
Setting detail: OUTPATIENT SURGERY
Discharge: HOME | End: 2018-01-22
Attending: ORTHOPAEDIC SURGERY | Admitting: ORTHOPAEDIC SURGERY
Payer: OTHER MISCELLANEOUS

## 2018-01-22 VITALS — SYSTOLIC BLOOD PRESSURE: 139 MMHG | DIASTOLIC BLOOD PRESSURE: 96 MMHG | HEART RATE: 93 BPM | HEIGHT: 67 IN

## 2018-01-22 VITALS
HEIGHT: 67 IN | DIASTOLIC BLOOD PRESSURE: 74 MMHG | WEIGHT: 200 LBS | SYSTOLIC BLOOD PRESSURE: 115 MMHG | RESPIRATION RATE: 18 BRPM | HEART RATE: 89 BPM | BODY MASS INDEX: 31.39 KG/M2

## 2018-01-22 VITALS
OXYGEN SATURATION: 97 % | WEIGHT: 200 LBS | BODY MASS INDEX: 31.39 KG/M2 | RESPIRATION RATE: 16 BRPM | SYSTOLIC BLOOD PRESSURE: 141 MMHG | HEART RATE: 64 BPM | DIASTOLIC BLOOD PRESSURE: 78 MMHG | HEIGHT: 67 IN | TEMPERATURE: 98.8 F

## 2018-01-22 VITALS — HEART RATE: 66 BPM | SYSTOLIC BLOOD PRESSURE: 119 MMHG | DIASTOLIC BLOOD PRESSURE: 82 MMHG | HEIGHT: 67 IN

## 2018-01-22 VITALS — HEIGHT: 67 IN | DIASTOLIC BLOOD PRESSURE: 82 MMHG | SYSTOLIC BLOOD PRESSURE: 122 MMHG | HEART RATE: 64 BPM

## 2018-01-22 DIAGNOSIS — T84.84XA PAIN DUE TO INTERNAL ORTHOPEDIC PROSTHETIC DEVICES, IMPLANTS AND GRAFTS, INITIAL ENCOUNTER (HCC): ICD-10-CM

## 2018-01-22 PROCEDURE — 73501 X-RAY EXAM HIP UNI 1 VIEW: CPT

## 2018-01-22 RX ORDER — MAGNESIUM HYDROXIDE 1200 MG/15ML
LIQUID ORAL AS NEEDED
Status: DISCONTINUED | OUTPATIENT
Start: 2018-01-22 | End: 2018-01-22 | Stop reason: HOSPADM

## 2018-01-22 RX ORDER — OXYCODONE HYDROCHLORIDE 5 MG/1
5 CAPSULE ORAL EVERY 4 HOURS PRN
Status: ON HOLD | COMMUNITY
End: 2018-01-22

## 2018-01-22 RX ORDER — HYDROMORPHONE HYDROCHLORIDE 4 MG/1
4 TABLET ORAL EVERY 6 HOURS PRN
Qty: 10 TABLET | Refills: 0 | Status: SHIPPED | OUTPATIENT
Start: 2018-01-22 | End: 2018-01-25

## 2018-01-22 RX ORDER — EPHEDRINE SULFATE 50 MG/ML
INJECTION, SOLUTION INTRAVENOUS AS NEEDED
Status: DISCONTINUED | OUTPATIENT
Start: 2018-01-22 | End: 2018-01-22 | Stop reason: SURG

## 2018-01-22 RX ORDER — FENTANYL CITRATE 50 UG/ML
INJECTION, SOLUTION INTRAMUSCULAR; INTRAVENOUS AS NEEDED
Status: DISCONTINUED | OUTPATIENT
Start: 2018-01-22 | End: 2018-01-22 | Stop reason: SURG

## 2018-01-22 RX ORDER — SODIUM CHLORIDE, SODIUM LACTATE, POTASSIUM CHLORIDE, CALCIUM CHLORIDE 600; 310; 30; 20 MG/100ML; MG/100ML; MG/100ML; MG/100ML
20 INJECTION, SOLUTION INTRAVENOUS CONTINUOUS
Status: DISCONTINUED | OUTPATIENT
Start: 2018-01-22 | End: 2018-01-22 | Stop reason: HOSPADM

## 2018-01-22 RX ORDER — HYDROMORPHONE HYDROCHLORIDE 2 MG/1
4 TABLET ORAL EVERY 4 HOURS PRN
Status: DISCONTINUED | OUTPATIENT
Start: 2018-01-22 | End: 2018-01-22 | Stop reason: HOSPADM

## 2018-01-22 RX ORDER — MIDAZOLAM HYDROCHLORIDE 1 MG/ML
INJECTION INTRAMUSCULAR; INTRAVENOUS AS NEEDED
Status: DISCONTINUED | OUTPATIENT
Start: 2018-01-22 | End: 2018-01-22 | Stop reason: SURG

## 2018-01-22 RX ORDER — GLYCOPYRROLATE 0.2 MG/ML
INJECTION INTRAMUSCULAR; INTRAVENOUS AS NEEDED
Status: DISCONTINUED | OUTPATIENT
Start: 2018-01-22 | End: 2018-01-22 | Stop reason: SURG

## 2018-01-22 RX ORDER — PROPOFOL 10 MG/ML
INJECTION, EMULSION INTRAVENOUS AS NEEDED
Status: DISCONTINUED | OUTPATIENT
Start: 2018-01-22 | End: 2018-01-22 | Stop reason: SURG

## 2018-01-22 RX ORDER — ACETAMINOPHEN 325 MG/1
975 TABLET ORAL ONCE
Status: COMPLETED | OUTPATIENT
Start: 2018-01-22 | End: 2018-01-22

## 2018-01-22 RX ORDER — ONDANSETRON 2 MG/ML
4 INJECTION INTRAMUSCULAR; INTRAVENOUS ONCE AS NEEDED
Status: DISCONTINUED | OUTPATIENT
Start: 2018-01-22 | End: 2018-01-22 | Stop reason: HOSPADM

## 2018-01-22 RX ORDER — HYDROMORPHONE HYDROCHLORIDE 2 MG/1
2 TABLET ORAL EVERY 4 HOURS PRN
Status: DISCONTINUED | OUTPATIENT
Start: 2018-01-22 | End: 2018-01-22

## 2018-01-22 RX ORDER — OXYCODONE HYDROCHLORIDE 5 MG/1
5 TABLET ORAL ONCE
Status: COMPLETED | OUTPATIENT
Start: 2018-01-22 | End: 2018-01-22

## 2018-01-22 RX ORDER — ROCURONIUM BROMIDE 10 MG/ML
INJECTION, SOLUTION INTRAVENOUS AS NEEDED
Status: DISCONTINUED | OUTPATIENT
Start: 2018-01-22 | End: 2018-01-22 | Stop reason: SURG

## 2018-01-22 RX ORDER — ONDANSETRON 2 MG/ML
INJECTION INTRAMUSCULAR; INTRAVENOUS AS NEEDED
Status: DISCONTINUED | OUTPATIENT
Start: 2018-01-22 | End: 2018-01-22 | Stop reason: SURG

## 2018-01-22 RX ORDER — SODIUM CHLORIDE, SODIUM LACTATE, POTASSIUM CHLORIDE, CALCIUM CHLORIDE 600; 310; 30; 20 MG/100ML; MG/100ML; MG/100ML; MG/100ML
100 INJECTION, SOLUTION INTRAVENOUS CONTINUOUS
Status: DISCONTINUED | OUTPATIENT
Start: 2018-01-22 | End: 2018-01-22 | Stop reason: HOSPADM

## 2018-01-22 RX ORDER — LIDOCAINE HYDROCHLORIDE 10 MG/ML
INJECTION, SOLUTION INFILTRATION; PERINEURAL AS NEEDED
Status: DISCONTINUED | OUTPATIENT
Start: 2018-01-22 | End: 2018-01-22 | Stop reason: SURG

## 2018-01-22 RX ORDER — OXYCODONE HYDROCHLORIDE 5 MG/1
5 CAPSULE ORAL EVERY 4 HOURS PRN
Qty: 3 CAPSULE | Refills: 0 | Status: SHIPPED | OUTPATIENT
Start: 2018-01-22 | End: 2018-01-25

## 2018-01-22 RX ADMIN — HYDROMORPHONE HYDROCHLORIDE 0.4 MG: 1 INJECTION, SOLUTION INTRAMUSCULAR; INTRAVENOUS; SUBCUTANEOUS at 16:09

## 2018-01-22 RX ADMIN — ONDANSETRON 4 MG: 2 INJECTION INTRAMUSCULAR; INTRAVENOUS at 14:33

## 2018-01-22 RX ADMIN — HYDROMORPHONE HYDROCHLORIDE 0.4 MG: 1 INJECTION, SOLUTION INTRAMUSCULAR; INTRAVENOUS; SUBCUTANEOUS at 16:20

## 2018-01-22 RX ADMIN — ACETAMINOPHEN 975 MG: 325 TABLET, FILM COATED ORAL at 10:53

## 2018-01-22 RX ADMIN — EPHEDRINE SULFATE 5 MG: 50 INJECTION, SOLUTION INTRAMUSCULAR; INTRAVENOUS; SUBCUTANEOUS at 13:50

## 2018-01-22 RX ADMIN — FENTANYL CITRATE 50 MCG: 50 INJECTION, SOLUTION INTRAMUSCULAR; INTRAVENOUS at 14:05

## 2018-01-22 RX ADMIN — ROCURONIUM BROMIDE 40 MG: 10 INJECTION INTRAVENOUS at 13:44

## 2018-01-22 RX ADMIN — SODIUM CHLORIDE, SODIUM LACTATE, POTASSIUM CHLORIDE, AND CALCIUM CHLORIDE: .6; .31; .03; .02 INJECTION, SOLUTION INTRAVENOUS at 13:30

## 2018-01-22 RX ADMIN — HYDROMORPHONE HYDROCHLORIDE 4 MG: 2 TABLET ORAL at 17:29

## 2018-01-22 RX ADMIN — FENTANYL CITRATE 50 MCG: 50 INJECTION, SOLUTION INTRAMUSCULAR; INTRAVENOUS at 14:50

## 2018-01-22 RX ADMIN — NEOSTIGMINE METHYLSULFATE 3 MG: 1 INJECTION, SOLUTION INTRAMUSCULAR; INTRAVENOUS; SUBCUTANEOUS at 14:56

## 2018-01-22 RX ADMIN — MIDAZOLAM HYDROCHLORIDE 2 MG: 1 INJECTION, SOLUTION INTRAMUSCULAR; INTRAVENOUS at 13:33

## 2018-01-22 RX ADMIN — LIDOCAINE HYDROCHLORIDE 50 MG: 10 INJECTION, SOLUTION INFILTRATION; PERINEURAL at 13:44

## 2018-01-22 RX ADMIN — GLYCOPYRROLATE 0.5 MG: 0.2 INJECTION, SOLUTION INTRAMUSCULAR; INTRAVENOUS at 14:55

## 2018-01-22 RX ADMIN — DEXAMETHASONE SODIUM PHOSPHATE 10 MG: 10 INJECTION INTRAMUSCULAR; INTRAVENOUS at 14:15

## 2018-01-22 RX ADMIN — PROPOFOL 200 MG: 10 INJECTION, EMULSION INTRAVENOUS at 13:44

## 2018-01-22 RX ADMIN — OXYCODONE HYDROCHLORIDE 5 MG: 5 TABLET ORAL at 10:53

## 2018-01-22 RX ADMIN — HYDROMORPHONE HYDROCHLORIDE 0.4 MG: 1 INJECTION, SOLUTION INTRAMUSCULAR; INTRAVENOUS; SUBCUTANEOUS at 16:14

## 2018-01-22 RX ADMIN — SODIUM CHLORIDE, SODIUM LACTATE, POTASSIUM CHLORIDE, AND CALCIUM CHLORIDE 20 ML/HR: .6; .31; .03; .02 INJECTION, SOLUTION INTRAVENOUS at 10:27

## 2018-01-22 RX ADMIN — HYDROMORPHONE HYDROCHLORIDE 0.4 MG: 1 INJECTION, SOLUTION INTRAMUSCULAR; INTRAVENOUS; SUBCUTANEOUS at 16:02

## 2018-01-22 RX ADMIN — CEFAZOLIN SODIUM 2000 MG: 2 SOLUTION INTRAVENOUS at 13:55

## 2018-01-22 RX ADMIN — FENTANYL CITRATE 100 MCG: 50 INJECTION, SOLUTION INTRAMUSCULAR; INTRAVENOUS at 15:19

## 2018-01-22 RX ADMIN — FENTANYL CITRATE 50 MCG: 50 INJECTION, SOLUTION INTRAMUSCULAR; INTRAVENOUS at 13:44

## 2018-01-22 RX ADMIN — FENTANYL CITRATE 50 MCG: 50 INJECTION, SOLUTION INTRAMUSCULAR; INTRAVENOUS at 15:20

## 2018-01-22 NOTE — OP NOTE
OPERATIVE REPORT  PATIENT NAME: Andrew Peterson    :  1985  MRN: [id number]  Pt Location: BE OR ROOM 18    SURGERY DATE: 2018    Surgeon(s) and Role:     * Louie Perez MD - Primary     * Judy Flores PA-C - Janelle Salgado MD - Assisting    Preop Diagnosis:  Pain due to internal orthopedic prosthetic devices, implants and grafts, initial encounter [facility]) Alyssa Shaw    Post-Op Diagnosis Codes:     * Pain due to internal orthopedic prosthetic devices, implants and grafts, initial encounter ([place] [address] ) Alyssa Shaw    Procedure(s) (LRB):  HIP REMOVAL OF CANNULATED SCREW AND POSSIBLE RE-INSERTION  (Right)    Specimen(s):  * No specimens in log *    Estimated Blood Loss:   Minimal    Drains:       Anesthesia Type:   General    Operative Indications:  Pain due to internal orthopedic prosthetic devices, implants and grafts, initial encounter ([place] [address] ) [V48 11AC]      Operative Findings:  6 5 cannulated screw penetrating right femoral head    Complications:   None    Procedure and Technique:  Removal of 6 5 cannulated screw from right hip  The patient had a sudden fall on the ice last week and noted pain in his hip and clicking when he walked after the fall  Previously there was no problem with his right hip  X-rays were taken in the office and it was noted that the 6 5 cannulated screw was not penetrating through the femoral head where as previously it was not  It was felt it was best to get the screw removed as quickly as possible  Patient was brought the operating room and administered a general anesthetic with trach intubation  He was then transferred to the fracture table and prepped and draped in usual sterile fashion for an operation on the right hip small incision was made across the area just lateral to the cannulated screw  The girth of the thigh at this point was quite big so a 6 cm incision was made  Sharp dissection was carried out down through subcutaneous tissue to the fascia  The fascia was incised the length of the wound  The area of the cannulated screw was carefully exposed using a periosteal elevator  The guidewire for the 6 5 cannulated screw was inserted into the screw  Screwdriver was inserted over the guidewire and the screw was carefully removed  Under fluoroscopic guidance the hip was evaluated for healing and there was no sign of movement at the femoral neck in the region of the healed fracture  The wound was thoroughly irrigated with sterile saline solution  The fascia was closed using 0 Vicryl in a figure-of-eight fashion  Subcutaneous tissue was closed using 2 0 Vicryl in inverted fashion the skin was closed using staples  The wound was treated with that chlorhexidine prep a Acticoat was placed over the wound and gauze squares an ABD and Tegaderm  The patient tolerated procedure well left the operating in good condition     I was present for the entire procedure    Patient Disposition:  PACU     SIGNATURE: Princess Guajardo MD  DATE: January 22, 2018  TIME: 3:39 PM

## 2018-01-22 NOTE — DISCHARGE INSTRUCTIONS
Discharge Instructions - Orthopedics  Mayur Nixon 28 y o  male MRN: [id number]  Unit/Bed#: PACU 05    Weight Bearing Status:                                           Weight Bearing as tolerated Right Lower Extremity, Use crutches/cane as needed    DVT prophylaxis:  Take Aspirin 325mg PO BID for traveling  Remember to walk on the plane hourly to prevent DVT    Pain:  Continue analgesics as directed    Dressing Instructions:   Keep dressing clean, dry and intact until follow up appointment  Appt Instructions: If you do not have your appointment, please call the clinic at [phone] to f/u with Dr Dahiana Don the office sooner if you experience any increased numbness/tingling in the extremities

## 2018-01-22 NOTE — ANESTHESIA POSTPROCEDURE EVALUATION
Post-Op Assessment Note      CV Status:  Stable    Mental Status:  Alert and awake    Hydration Status:  Euvolemic    PONV Controlled:  Controlled    Airway Patency:  Patent    Post Op Vitals Reviewed: Yes          Staff: CRNA, Anesthesiologist           BP   133/80   Temp 98 2 °F (36 8 °C) (01/22/18 1531)    Pulse 76 (01/22/18 1531)   Resp 14 (01/22/18 1531)    SpO2   100

## 2018-01-22 NOTE — ANESTHESIA PREPROCEDURE EVALUATION
Review of Systems/Medical History    Chart reviewed  No history of anesthetic complications     Cardiovascular  Exercise tolerance: good, Exercise comment: Prior to injury, METS>4    Pulmonary  Negative pulmonary ROS        GI/Hepatic  Negative GI/hepatic ROS          Negative  ROS        Endo/Other  Negative endo/other ROS      GYN       Hematology  Negative hematology ROS      Musculoskeletal  Negative musculoskeletal ROS        Neurology  Negative neurology ROS      Psychology   Anxiety, Depression ,              Physical Exam    Airway    Mallampati score: I  TM Distance: >3 FB  Neck ROM: full     Dental   No notable dental hx     Cardiovascular      Pulmonary      Other Findings        Anesthesia Plan  ASA Score- 2     Anesthesia Type- general with ASA Monitors  Additional Monitors:   Airway Plan: ETT  Plan Factors-    Induction- intravenous  Postoperative Plan-     Informed Consent- Anesthetic plan and risks discussed with patient  I personally reviewed this patient with the CRNA  Discussed and agreed on the Anesthesia Plan with the CRNA  Maria Antonia [place]

## 2018-01-23 NOTE — CONSULTS
Therapy  Rehabilitation Services Referral:   Patient Status: acute  Diagnosis: Right hip, status post displaced femoral neck fracture  Rehabilitation Services: evaluate and treat patient as needed  Frequency: 3 times per week, for 6 weeks  Please send progress report  Signatures   Electronically signed by :  LAURA Almeida ; Dec 21 2017  1:01PM EST                       (Author)

## 2018-01-23 NOTE — PROGRESS NOTES
Preliminary Nursing Report                Patient Information    Initial Encounter Entry Date:   2018 11:29 AM EST (Automated Transmission Automated Transmission)       Last Modified:   {Lauren Pryor}              Legal Name: Ky Garcia Number:        YOB: 1985        Age (years): 28        Gender: M        Body Mass Index (BMI): 31 kg/m2        Height: 67 in  Weight: 200 lbs (91 kgs)           Address:   [facility] [id number]               Phone: [phone]   (consent to leave messages)        Email:        Ethnicity: Decline to State        Sabianist:        Marital Status:        Preferred Language: English        Race: Other Race                    Patient Insurance Information        Primary Insurance Information Carrier Name: {Primary  CarrierName}           Carrier Address:   {Primary  CarrierAddress}              Carrier Phone: {Primary  CarrierPhone}          Group Number: {Primary  GroupNumber}          Policy Number: {Primary  PolicyNumber}          Insured Name: {Primary  InsuredName}          Insured : {Primary  InsuredDOB}          Relationship to Insured: {Primary  RelationshiptoInsured}           Secondary Insurance Information Carrier Name: {Secondary  CarrierName}           Carrier Address:   {Secondary  CarrierAddress}              Carrier Phone: {Secondary  CarrierPhone}          Group Number: {Secondary  GroupNumber}          Policy Number: {Secondary  PolicyNumber}          Insured Name: {Secondary  InsuredName}          Insured : {Secondary  InsuredDOB}          Relationship to Insured: {Secondary  RelationshiptoInsured}                       Health Profile   Booking #:   Yvette Alcazar #: [id number]               DOS: 2018    Surgery : REMOVAL OF SUPPORT IMPLANT      Add'l Procedures/Notes:     Surgery Risk: Intermediate          Precautions          Allergies    No Known Drug Allergies Medications    Citalopram Hydrobromide 40 MG Oral Tablet       ClonazePAM 0 5 MG Oral Tablet       Methocarbamol 750 MG Oral Tablet       OxyCODONE HCl - 10 MG Oral Tablet       OxyCODONE HCl - 5 MG Oral Tablet       Percocet TABS               Conditions    Aftercare following surgery for injury or trauma       Closed fracture of right hip requiring operative repair, with routine healing, subsequent encounter               Family History    None             Surgical History    None             Social History    Current every day smoker                               Patient Instructions       Medical Procedure Risk  NPO Instructions   The day before surgery it is recommended to have a light dinner at your usual time and you are allowed a light snack early in the evening  Do not eat anything heavy or eat a big meal after 7pm  Do not eat or drink anything after midnight prior to your surgery  If you are supposed to take any of your medications, do so with a sip of water  Failure to follow these instructions can lead to an increased risk of lung complications and may result in a delay or cancellation of your procedure  If you have any questions, contact your institution for further instructions  No candy, no gum, no mints, no chewing tobacco          ClonazePAM 0 5 MG Oral Tablet  Medication Instruction (Benzodiazepine) 15  Please continue the following medications, if needed, up to and including the day of surgery (with a sip of water)  OxyCODONE HCl - 5 MG Oral Tablet, , OxyCODONE HCl - 10 MG Oral Tablet  Medication Instruction (Opioids - Pain Medication) 62  Please continue the following medications, if needed, up to and including the day of surgery (with a sip of water)  Citalopram Hydrobromide 40 MG Oral Tablet  Medication Instruction (SSRI - Antidepressants) 80  Please continue to take this medication on your normal schedule   If this is an oral medication and you take in the morning, you may do so with a sip of water  Current every day smoker  Smoking Cessation   Smoking before and after surgery can lead to complications  Patients who quit smoking at least eight weeks before surgery have complication rates almost as low as non-smokers  Smokers who can stop smoking 24 or 48 hours before surgery may also benefit from decreased amounts of nicotine and carbon monoxide in the body  For help quitting smoking, speak with your physician or contact the [facility] or American [employer]  Please visit the following web address for assistance with quitting  [facility] be  [facility]/Anesthesia-Topics/Kqh-Cgy-ue-Quit-Smoking  aspx  Testing Considerations       No recommendations for this classification  Consultations       No recommendations for this classification  Miscellaneous Questions         Question: Are you able to walk up a flight of stairs, walk up a hill or do heavy housework WITHOUT having chest pain or shortness of breath? Answer: YES                   Allergies/Conditions/Medications Not Found        The following were not recognized by our system when generating the recommendations  Please consider if this would impact any preoperative protocols  ? Percocet TABS                  Appointment Information         Date:    01/22/2018        Location:    [facility]        Address:           Directions:                      Footnotes revision 14      ?? Denotes a free-text entry  Legal Disclaimer: Any and all recommendations and services provided herein are designed to assist in the preoperative care of the patient  Nothing contained herein is designed to replace, eliminate or alleviate the responsibility of the attending physician to supervise and determine the patient?s preoperative care and course of treatment   Failure to provide complete, accurate information may negatively impact the system?s ability to recommend the proper preoperative protocol  THE ATTENDING PHYSICIAN IS RESPONSIBLE TO REVIEW THE SUGGESTED PREOPERATIVE PROTOCOLS/COURSE OF TREATMENT AND PRESCRIBE THE FINAL COURSE OF PREOPERATIVE TREATMENT IN CONSULTATION WITH THE PATIENT  THE ePREOP SYSTEM AND ITS MATERIALS ARE PROVIDED ? AS IS? WITHOUT WARRANTY OF ANY KIND, EXPRESS OR IMPLIED, INCLUDING, BUT NOT LIMITED TO, WARRANTIES OF PERFORMANCE OR MERCHANTABILITY OR FITNESS FOR A PARTICULAR PURPOSE  PATIENT AND PHYSICIANS HEREBY AGREE THAT THEIR USE OF THE MATERIALS AND RESOURCES ACT AS A CONSENT TO RELEASE AND WAIVE ePREOP FROM ANY AND ALL CLAIMS OF WARRANTY, TORT OR CONTRACT LAW OF ANY KIND

## 2018-01-23 NOTE — MISCELLANEOUS
Message  Return to work or school:   Juaquin Aldana is under my professional care  He was seen in my office on 12/07/17       Weight Bearing Status: Full Weight-Bearing  Lifting limited to 10 lbs or less  Signatures   Electronically signed by :  LAURA Shetty ; Dec 21 2017  1:01PM EST                       (Author)

## 2018-01-24 VITALS — HEART RATE: 72 BPM | DIASTOLIC BLOOD PRESSURE: 81 MMHG | SYSTOLIC BLOOD PRESSURE: 123 MMHG | HEIGHT: 67 IN

## 2018-01-25 ENCOUNTER — HOSPITAL ENCOUNTER (OUTPATIENT)
Dept: RADIOLOGY | Facility: HOSPITAL | Age: 33
Discharge: HOME | End: 2018-01-25
Attending: ORTHOPAEDIC SURGERY
Payer: OTHER MISCELLANEOUS

## 2018-01-25 ENCOUNTER — OFFICE VISIT (OUTPATIENT)
Dept: OBGYN CLINIC | Facility: HOSPITAL | Age: 33
End: 2018-01-25

## 2018-01-25 VITALS
HEART RATE: 88 BPM | HEIGHT: 67 IN | SYSTOLIC BLOOD PRESSURE: 143 MMHG | BODY MASS INDEX: 31.39 KG/M2 | DIASTOLIC BLOOD PRESSURE: 92 MMHG | WEIGHT: 200 LBS

## 2018-01-25 DIAGNOSIS — Z98.890 STATUS POST HARDWARE REMOVAL: Primary | ICD-10-CM

## 2018-01-25 PROCEDURE — 99024 POSTOP FOLLOW-UP VISIT: CPT | Performed by: ORTHOPAEDIC SURGERY

## 2018-01-25 PROCEDURE — 73502 X-RAY EXAM HIP UNI 2-3 VIEWS: CPT

## 2018-01-25 RX ORDER — CLONAZEPAM 0.25 MG/1
TABLET, ORALLY DISINTEGRATING ORAL
Refills: 0 | COMMUNITY
Start: 2017-11-22

## 2018-01-25 RX ORDER — METHOCARBAMOL 750 MG/1
1 TABLET, FILM COATED ORAL EVERY 6 HOURS PRN
COMMUNITY
Start: 2017-08-15

## 2018-01-25 RX ORDER — CITALOPRAM 40 MG/1
TABLET ORAL
COMMUNITY

## 2018-01-25 RX ORDER — OXYCODONE HYDROCHLORIDE 5 MG/1
TABLET ORAL
COMMUNITY
Start: 2017-08-15 | End: 2018-01-25 | Stop reason: SDUPTHER

## 2018-01-25 RX ORDER — OXYCODONE HYDROCHLORIDE 5 MG/1
10 TABLET ORAL EVERY 4 HOURS PRN
Qty: 30 TABLET | Refills: 0 | Status: SHIPPED | OUTPATIENT
Start: 2018-01-25 | End: 2018-01-30 | Stop reason: SDUPTHER

## 2018-01-25 RX ORDER — OXYCODONE AND ACETAMINOPHEN 10; 325 MG/1; MG/1
TABLET ORAL
COMMUNITY
End: 2018-01-25

## 2018-01-25 RX ORDER — CHOLECALCIFEROL (VITAMIN D3) 25 MCG
1000 TABLET,CHEWABLE ORAL
COMMUNITY
End: 2018-01-25

## 2018-01-25 RX ORDER — DULOXETIN HYDROCHLORIDE 20 MG/1
20 CAPSULE, DELAYED RELEASE ORAL
COMMUNITY
Start: 2017-11-22 | End: 2018-01-25

## 2018-01-25 RX ORDER — CLONAZEPAM 0.5 MG/1
TABLET ORAL
COMMUNITY
End: 2018-01-25

## 2018-01-25 RX ORDER — OXYCODONE HYDROCHLORIDE 10 MG/1
TABLET ORAL
COMMUNITY
Start: 2017-08-31 | End: 2018-01-25

## 2018-01-25 NOTE — PROGRESS NOTES
Assessment:       1  Status post hardware removal          Plan:       Patient will come back to the office in 1 week to remove the sutures  He wants to go back to work on Monday with a weight restriction of 10 pounds  We will discuss a short run with physical therapy next week if needed  Subjective:     Patient ID: Tolu Harmon is a 28 y o  male  Chief Complaint:    Patient is 3 days status post hardware removal  He is still in pain  His groin pain has diminished, but the lateral portion of the leg is in pain  He has trouble with his strength when going from a seated to standing position  He notices redness and warmth around the area of the incision  He has been experiencing chills at night since the surgery  Social History     Occupational History    Not on file  Social History Main Topics    Smoking status: Former Smoker     Packs/day: 0 50    Smokeless tobacco: Never Used    Alcohol use Yes      Comment: social    Drug use: No    Sexual activity: Not on file      Review of Systems   Constitutional: Positive for chills (every night since the surgery)  HENT: Negative  Eyes: Negative  Respiratory: Negative  Cardiovascular: Negative  Gastrointestinal: Negative  Endocrine: Negative  Genitourinary: Negative  Allergic/Immunologic: Negative  Neurological: Negative  Hematological: Negative  Psychiatric/Behavioral: Negative  Objective:     Ortho ExamPhysical Exam   Constitutional: He is oriented to person, place, and time  He appears well-developed and well-nourished  HENT:   Head: Normocephalic and atraumatic  Eyes: Pupils are equal, round, and reactive to light  Cardiovascular: Intact distal pulses  Pulmonary/Chest: Breath sounds normal    Neurological: He is alert and oriented to person, place, and time  Skin: Skin is warm and dry  No rash noted  Psychiatric: He has a normal mood and affect   His behavior is normal          I have personally reviewed pertinent films in PACS

## 2018-01-25 NOTE — LETTER
January 25, 2018     Patient: Mercedes Fletcher   YOB: 1985   Date of Visit: 1/25/2018       To Whom it May Concern:    Mercedes Fletcher is under my professional care  He was seen in my office on 1/25/2018  He may return to work with limitations 10 pound weight limit  May return on Monday, 01/29/2018  If you have any questions or concerns, please don't hesitate to call           Sincerely,          Gianluca Julian MD        CC: No Recipients

## 2018-01-29 ENCOUNTER — TELEPHONE (OUTPATIENT)
Dept: OBGYN CLINIC | Facility: HOSPITAL | Age: 33
End: 2018-01-29

## 2018-01-29 NOTE — TELEPHONE ENCOUNTER
Caller: patient  Callback# [phone]  Dr Trevino Messing      Patient is in a lot of pain since surgery he is requesting refill for Oxycodone  Please advise thanks

## 2018-01-30 DIAGNOSIS — Z98.890 STATUS POST HARDWARE REMOVAL: ICD-10-CM

## 2018-01-30 RX ORDER — OXYCODONE HYDROCHLORIDE 5 MG/1
TABLET ORAL
Qty: 40 TABLET | Refills: 0 | Status: SHIPPED | OUTPATIENT
Start: 2018-01-30 | End: 2018-02-02 | Stop reason: SDUPTHER

## 2018-01-30 NOTE — TELEPHONE ENCOUNTER
Patient aware RX is ready  States the pain is in his groin and it started 3 days post surgery  Alternate the Aleve and Tylenol and icing 30 min on 30 min off  Patient to call if any redness, purulent drainage, or inc swelling  He was advised to make this last until fridays appt  Verbalized understanding

## 2018-02-02 ENCOUNTER — OFFICE VISIT (OUTPATIENT)
Dept: OBGYN CLINIC | Facility: HOSPITAL | Age: 33
End: 2018-02-02

## 2018-02-02 VITALS
BODY MASS INDEX: 32.96 KG/M2 | DIASTOLIC BLOOD PRESSURE: 85 MMHG | SYSTOLIC BLOOD PRESSURE: 127 MMHG | HEART RATE: 86 BPM | WEIGHT: 210 LBS | HEIGHT: 67 IN

## 2018-02-02 DIAGNOSIS — Z98.890 STATUS POST HARDWARE REMOVAL: ICD-10-CM

## 2018-02-02 DIAGNOSIS — Z47.89 AFTERCARE FOLLOWING SURGERY OF THE MUSCULOSKELETAL SYSTEM: Primary | ICD-10-CM

## 2018-02-02 PROCEDURE — 99024 POSTOP FOLLOW-UP VISIT: CPT | Performed by: PHYSICIAN ASSISTANT

## 2018-02-02 RX ORDER — OXYCODONE HYDROCHLORIDE 5 MG/1
TABLET ORAL
Qty: 60 TABLET | Refills: 0 | Status: SHIPPED | OUTPATIENT
Start: 2018-02-02 | End: 2018-02-13 | Stop reason: SDUPTHER

## 2018-02-02 RX ORDER — IBUPROFEN 800 MG/1
800 TABLET ORAL EVERY 8 HOURS SCHEDULED
Qty: 30 TABLET | Refills: 0 | Status: SHIPPED | OUTPATIENT
Start: 2018-02-02

## 2018-02-02 RX ORDER — DOCUSATE SODIUM 100 MG/1
CAPSULE, LIQUID FILLED ORAL
Refills: 0 | COMMUNITY
Start: 2018-01-22

## 2018-02-02 NOTE — PROGRESS NOTES
28 y o male presents to the office for 2 week follow-up status post removal of painful cannulated screw after a fall which dislodged it  Patient states that he continues to have pain with his ambulatory activities especially while at work  Patient denies any numbness/tingling radiating down the leg  He denies any signs of the infection  Patient denies any other acute or associated complaints  Patient states that he is required to work 7 hours a day and must ambulate throughout his entire workday  He states that he needs to manage his pain with oxycodone on a regular basis  Review of Systems  Review of systems negative unless otherwise specified in HPI    Past Medical History  Past Medical History:   Diagnosis Date    Anxiety     Depression        Past Surgical History  Past Surgical History:   Procedure Laterality Date    ORIF HIP FRACTURE Right 1/22/2018    Procedure: HIP REMOVAL OF CANNULATED SCREW AND POSSIBLE RE-INSERTION ;  Surgeon: Jeffrey Sargent MD;  [facility];  Service: Orthopedics       Current Medications  Current Outpatient Prescriptions on File Prior to Visit   Medication Sig Dispense Refill    cholecalciferol (VITAMIN D3) 1,000 units tablet Take 1,000 Units by mouth      citalopram (CeleXA) 40 mg tablet Take by mouth      clonazePAM (KlonoPIN) 0 25 MG disintegrating tablet TAKE 2 TABLETS EVERY 8 HOURS  0    methocarbamol (ROBAXIN) 750 mg tablet Take 1 tablet by mouth every 6 (six) hours as needed      oxyCODONE (ROXICODONE) 5 mg immediate release tablet Take 1-2 tablets PO q 4-6 hours PRN pain 40 tablet 0     No current facility-administered medications on file prior to visit            Physical exam  · General: Awake, Alert, Oriented  · Eyes: Pupils equal, round and reactive to light  · Heart: regular rate and rhythm  · Lungs: No audible wheezing  · Abdomen: soft  right Lower extremity  · Minimal tenderness to palpation at the anterior hip joint  · Well-healed surgical incision  · Good range of motion at the level of the hip without pain  · Sensation intact  · Patient is neurovascularly intact    Imaging  None    Procedure  None    Assessment/Plan:   28 y o male presenting for 2 week follow-up status post removal of painful hardware  Patient to continue with activities as tolerated  Patient can continue with oxycodone as needed  Did discuss with patient that he should at on Motrin as this is likely inflammation causing his pain, which is not treated by narcotic medications  Patient to follow-up in 2 weeks for repeat evaluation with x-ray  Patient to call office or return sooner with any other concerns or complaints

## 2018-02-12 PROBLEM — Z47.89 AFTERCARE FOLLOWING SURGERY OF THE MUSCULOSKELETAL SYSTEM: Status: ACTIVE | Noted: 2018-02-12

## 2018-02-13 ENCOUNTER — OFFICE VISIT (OUTPATIENT)
Dept: OBGYN CLINIC | Facility: HOSPITAL | Age: 33
End: 2018-02-13

## 2018-02-13 ENCOUNTER — HOSPITAL ENCOUNTER (OUTPATIENT)
Dept: CT IMAGING | Facility: HOSPITAL | Age: 33
Discharge: HOME | End: 2018-02-13
Payer: OTHER MISCELLANEOUS

## 2018-02-13 ENCOUNTER — HOSPITAL ENCOUNTER (OUTPATIENT)
Dept: RADIOLOGY | Facility: HOSPITAL | Age: 33
Discharge: HOME | End: 2018-02-13
Attending: ORTHOPAEDIC SURGERY
Payer: OTHER MISCELLANEOUS

## 2018-02-13 VITALS — DIASTOLIC BLOOD PRESSURE: 73 MMHG | HEART RATE: 65 BPM | SYSTOLIC BLOOD PRESSURE: 111 MMHG

## 2018-02-13 DIAGNOSIS — Z98.890 STATUS POST HARDWARE REMOVAL: ICD-10-CM

## 2018-02-13 DIAGNOSIS — Z47.89 AFTERCARE FOLLOWING SURGERY OF THE MUSCULOSKELETAL SYSTEM: Primary | ICD-10-CM

## 2018-02-13 PROCEDURE — 73502 X-RAY EXAM HIP UNI 2-3 VIEWS: CPT

## 2018-02-13 PROCEDURE — 73700 CT LOWER EXTREMITY W/O DYE: CPT

## 2018-02-13 PROCEDURE — 99024 POSTOP FOLLOW-UP VISIT: CPT | Performed by: ORTHOPAEDIC SURGERY

## 2018-02-13 RX ORDER — OXYCODONE HYDROCHLORIDE 5 MG/1
TABLET ORAL
Qty: 60 TABLET | Refills: 0 | Status: SHIPPED | OUTPATIENT
Start: 2018-02-13 | End: 2018-02-19 | Stop reason: SDUPTHER

## 2018-02-13 NOTE — PATIENT INSTRUCTIONS
Non weight bearing right lower extremity     Continue taking narcotic and over the counter medications as discussed in the office     Obtain R hip CT scan    F/u in office following CT scan to discuss results

## 2018-02-13 NOTE — PROGRESS NOTES
28 y o male presenting to the office for 3 week follow up s/p removal of cannulated screw  The patient states that he has been having continued severe groin pain  Pain is well localized to his groin  Pain is made worse with weight-bearing and increased activity  Patient denies any numbness or tingling  Patient feels as though his pain is not improved at all since the surgery and has actually worsened  Review of Systems  Review of systems negative unless otherwise specified in HPI    Past Medical History  Past Medical History:   Diagnosis Date    Anxiety     Depression        Past Surgical History  Past Surgical History:   Procedure Laterality Date    ORIF HIP FRACTURE Right 1/22/2018    Procedure: HIP REMOVAL OF CANNULATED SCREW AND POSSIBLE RE-INSERTION ;  Surgeon: Lilly Welch MD;  [facility];  Service: Orthopedics       Current Medications  Current Outpatient Prescriptions on File Prior to Visit   Medication Sig Dispense Refill    cholecalciferol (VITAMIN D3) 1,000 units tablet Take 1,000 Units by mouth      citalopram (CeleXA) 40 mg tablet Take by mouth      clonazePAM (KlonoPIN) 0 25 MG disintegrating tablet TAKE 2 TABLETS EVERY 8 HOURS  0    docusate sodium (COLACE) 100 mg capsule TAKE 1 CAPSULES BY MOUTH 2 (TWO) TIMES A DAY AS NEEDED FOR CONSTIPATION  0    ibuprofen (MOTRIN) 800 mg tablet Take 1 tablet (800 mg total) by mouth every 8 (eight) hours 30 tablet 0    methocarbamol (ROBAXIN) 750 mg tablet Take 1 tablet by mouth every 6 (six) hours as needed      oxyCODONE (ROXICODONE) 5 mg immediate release tablet Take 1-2 tablets PO q 4-6 hours PRN pain 60 tablet 0     No current facility-administered medications on file prior to visit          Recent Labs [facility])  @LABALLVALUEIP(HCT,HGB,WBC,PT,INR,ESR,CRP,GLUCOSE,HGBA1C)@      Physical exam  · General: Awake, Alert, Oriented  · Eyes: Pupils equal, round and reactive to light  · Heart: regular rate and rhythm  · Lungs: No audible wheezing  · Abdomen: soft  right Lower extremity  · Surgical incision with mild erythema at distal and, no ecchymosis, no drainage, no palpable fluctuance  · Mild carter-incisional tenderness to palpation that is appropriate  · Weakness with hip flexion, pain with resisted hip flexion  · Pain with internal external rotation of hip  · Tenderness to palpation over groin, no tenderness palpation over lateral trochanteric flare  · Distal extremity warm and sensate      Imaging  Right hip x-rays taken reviewed at today's visit  When compared to right hip x-rays taken several months ago, there appears to be mild superior migration of femoral head  There is no significant difference in appearance of right hip or implant when compared to x-rays taken several weeks ago  No obvious new fractures noted  Procedure  None    Assessment/Plan:   32 y o male 3 weeks status post removal of cannulated screw from right hip and approximately 7 months status post ORIF right femoral neck stress fracture with increasing right groin pain over the last several weeks  Given patient's clinical exam and radiographs taken at today's visit that show possible migration of femoral head, we will obtain CT scan of right hip to evaluate for fracture healing and possible new fracture  This was discussed with patient and he was in agreement with the plan  Pain regimen was also discussed  Patient will be provided with no oxycodone prescription at today's visit  He was also advised to continue taking over-the-counter Tylenol and ibuprofen in addition to narcotic medication for pain relief  Patient will be seen back in office following CT scan of right hip  He will be nonweightbearing right lower extremity with crutches until seen back in office

## 2018-02-15 ENCOUNTER — OFFICE VISIT (OUTPATIENT)
Dept: OBGYN CLINIC | Facility: HOSPITAL | Age: 33
End: 2018-02-15

## 2018-02-15 VITALS — DIASTOLIC BLOOD PRESSURE: 85 MMHG | HEIGHT: 67 IN | SYSTOLIC BLOOD PRESSURE: 144 MMHG | HEART RATE: 71 BPM

## 2018-02-15 DIAGNOSIS — S72.001G CLOSED FRACTURE OF NECK OF RIGHT FEMUR WITH DELAYED HEALING, SUBSEQUENT ENCOUNTER: Primary | ICD-10-CM

## 2018-02-15 PROCEDURE — 99024 POSTOP FOLLOW-UP VISIT: CPT | Performed by: ORTHOPAEDIC SURGERY

## 2018-02-15 NOTE — LETTER
February 15, 2018     Patient: Alan Rasheed   YOB: 1985   Date of Visit: 2/15/2018       To Whom it May Concern:    Alan Rasheed is under my professional care  He was seen in my office on 2/15/2018  He may return to work with limitations must remain in wheelchair or on crutches for at least 6 weeks  Recommend that he not travel > 15 miles for work commute       If you have any questions or concerns, please don't hesitate to call           Sincerely,          Mariely Bhakta MD        CC: Alan [place]

## 2018-02-16 NOTE — PROGRESS NOTES
28 y o male presenting to the office for follow up on CT of the right hip  Patient states that the pain in his hip continues and is unbearable  He has pain with all ambulatory activities  He denies any numbness/tingling or any other acute/assocated complaints  Review of Systems  Review of systems negative unless otherwise specified in HPI    Past Medical History  Past Medical History:   Diagnosis Date    Anxiety     Depression        Past Surgical History  Past Surgical History:   Procedure Laterality Date    ORIF HIP FRACTURE Right 1/22/2018    Procedure: HIP REMOVAL OF CANNULATED SCREW AND POSSIBLE RE-INSERTION ;  Surgeon: Maria Esther Sigala MD;  [facility];  Service: Orthopedics       Current Medications  Current Outpatient Prescriptions on File Prior to Visit   Medication Sig Dispense Refill    cholecalciferol (VITAMIN D3) 1,000 units tablet Take 1,000 Units by mouth      citalopram (CeleXA) 40 mg tablet Take by mouth      clonazePAM (KlonoPIN) 0 25 MG disintegrating tablet TAKE 2 TABLETS EVERY 8 HOURS  0    docusate sodium (COLACE) 100 mg capsule TAKE 1 CAPSULES BY MOUTH 2 (TWO) TIMES A DAY AS NEEDED FOR CONSTIPATION  0    ibuprofen (MOTRIN) 800 mg tablet Take 1 tablet (800 mg total) by mouth every 8 (eight) hours 30 tablet 0    methocarbamol (ROBAXIN) 750 mg tablet Take 1 tablet by mouth every 6 (six) hours as needed      oxyCODONE (ROXICODONE) 5 mg immediate release tablet Take 1-2 tablets PO q 4-6 hours PRN pain 60 tablet 0     No current facility-administered medications on file prior to visit          Recent Labs [facility])    0  Lab Value Date/Time   HCT 38 2 07/27/2017 0712   HGB 12 9 07/27/2017 0712   WBC 9 04 07/27/2017 0712   INR 1 02 07/25/2017 1702   GLUCOSE 128 07/27/2017 0654         Physical exam  · General: Awake, Alert, Oriented  · Eyes: Pupils equal, round and reactive to light  · Heart: regular rate and rhythm  · Lungs: No audible wheezing  · Abdomen: soft  right Lower extremity  · Slight tender to palpation  · Ambulating with cane and antalgic gait  · Pain with ROM and resistive motion  · Sensation intact    Imaging  CT demonstrates continued fracture line and post traumatic arthritic changes    Procedure  none    Assessment/Plan:   28 y o male with right hip fracture - not well healing  Dx either a nonunion vs re-injury with fall a few weeks ago  Discussed with patient that he needs to remain nonweight bearing for 6 weeks  In 4 weeks, we will re-xray his hip to assess for healing

## 2018-02-19 DIAGNOSIS — Z98.890 STATUS POST HARDWARE REMOVAL: ICD-10-CM

## 2018-02-19 RX ORDER — OXYCODONE HYDROCHLORIDE 5 MG/1
TABLET ORAL
Qty: 60 TABLET | Refills: 0 | Status: SHIPPED | OUTPATIENT
Start: 2018-02-19 | End: 2018-03-01 | Stop reason: SDUPTHER

## 2018-02-25 ENCOUNTER — APPOINTMENT (EMERGENCY)
Dept: RADIOLOGY | Facility: HOSPITAL | Age: 33
End: 2018-02-25
Payer: OTHER MISCELLANEOUS

## 2018-02-25 ENCOUNTER — HOSPITAL ENCOUNTER (EMERGENCY)
Facility: HOSPITAL | Age: 33
Discharge: HOME | End: 2018-02-25
Attending: EMERGENCY MEDICINE
Payer: OTHER MISCELLANEOUS

## 2018-02-25 VITALS
OXYGEN SATURATION: 96 % | DIASTOLIC BLOOD PRESSURE: 59 MMHG | TEMPERATURE: 98.3 F | RESPIRATION RATE: 18 BRPM | WEIGHT: 200 LBS | HEART RATE: 77 BPM | SYSTOLIC BLOOD PRESSURE: 130 MMHG | BODY MASS INDEX: 31.32 KG/M2

## 2018-02-25 DIAGNOSIS — Z47.89 AFTERCARE FOLLOWING SURGERY OF THE MUSCULOSKELETAL SYSTEM: Primary | ICD-10-CM

## 2018-02-25 DIAGNOSIS — L03.90 WOUND CELLULITIS: ICD-10-CM

## 2018-02-25 LAB
ANION GAP SERPL CALCULATED.3IONS-SCNC: 6 MMOL/L (ref 4–13)
BASOPHILS # BLD AUTO: 0.03 THOUSANDS/ΜL (ref 0–0.1)
BASOPHILS NFR BLD AUTO: 0 % (ref 0–1)
BUN SERPL-MCNC: 14 MG/DL (ref 5–25)
CALCIUM SERPL-MCNC: 9.1 MG/DL (ref 8.3–10.1)
CHLORIDE SERPL-SCNC: 104 MMOL/L (ref 100–108)
CO2 SERPL-SCNC: 27 MMOL/L (ref 21–32)
CREAT SERPL-MCNC: 0.93 MG/DL (ref 0.6–1.3)
CRP SERPL QL: 3.7 MG/L
EOSINOPHIL # BLD AUTO: 0.2 THOUSAND/ΜL (ref 0–0.61)
EOSINOPHIL NFR BLD AUTO: 2 % (ref 0–6)
ERYTHROCYTE [DISTWIDTH] IN BLOOD BY AUTOMATED COUNT: 12.3 % (ref 11.6–15.1)
ERYTHROCYTE [SEDIMENTATION RATE] IN BLOOD: 16 MM/HOUR (ref 0–10)
GFR SERPL CREATININE-BSD FRML MDRD: 108 ML/MIN/1.73SQ M
GLUCOSE SERPL-MCNC: 78 MG/DL (ref 65–140)
HCT VFR BLD AUTO: 43.2 % (ref 36.5–49.3)
HGB BLD-MCNC: 15.1 G/DL (ref 12–17)
LYMPHOCYTES # BLD AUTO: 3.27 THOUSANDS/ΜL (ref 0.6–4.47)
LYMPHOCYTES NFR BLD AUTO: 36 % (ref 14–44)
MCH RBC QN AUTO: 32.9 PG (ref 26.8–34.3)
MCHC RBC AUTO-ENTMCNC: 35 G/DL (ref 31.4–37.4)
MCV RBC AUTO: 94 FL (ref 82–98)
MONOCYTES # BLD AUTO: 0.95 THOUSAND/ΜL (ref 0.17–1.22)
MONOCYTES NFR BLD AUTO: 11 % (ref 4–12)
NEUTROPHILS # BLD AUTO: 4.54 THOUSANDS/ΜL (ref 1.85–7.62)
NEUTS SEG NFR BLD AUTO: 51 % (ref 43–75)
NRBC BLD AUTO-RTO: 0 /100 WBCS
PLATELET # BLD AUTO: 242 THOUSANDS/UL (ref 149–390)
PMV BLD AUTO: 9.2 FL (ref 8.9–12.7)
POTASSIUM SERPL-SCNC: 3.9 MMOL/L (ref 3.5–5.3)
RBC # BLD AUTO: 4.59 MILLION/UL (ref 3.88–5.62)
SODIUM SERPL-SCNC: 137 MMOL/L (ref 136–145)
WBC # BLD AUTO: 9.02 THOUSAND/UL (ref 4.31–10.16)

## 2018-02-25 PROCEDURE — 36415 COLL VENOUS BLD VENIPUNCTURE: CPT | Performed by: EMERGENCY MEDICINE

## 2018-02-25 PROCEDURE — 73502 X-RAY EXAM HIP UNI 2-3 VIEWS: CPT

## 2018-02-25 PROCEDURE — 99024 POSTOP FOLLOW-UP VISIT: CPT | Performed by: ORTHOPAEDIC SURGERY

## 2018-02-25 PROCEDURE — 99284 EMERGENCY DEPT VISIT MOD MDM: CPT

## 2018-02-25 PROCEDURE — 87040 BLOOD CULTURE FOR BACTERIA: CPT | Performed by: EMERGENCY MEDICINE

## 2018-02-25 PROCEDURE — 85025 COMPLETE CBC W/AUTO DIFF WBC: CPT | Performed by: EMERGENCY MEDICINE

## 2018-02-25 PROCEDURE — 85652 RBC SED RATE AUTOMATED: CPT | Performed by: ORTHOPAEDIC SURGERY

## 2018-02-25 PROCEDURE — 86140 C-REACTIVE PROTEIN: CPT | Performed by: ORTHOPAEDIC SURGERY

## 2018-02-25 PROCEDURE — 80048 BASIC METABOLIC PNL TOTAL CA: CPT | Performed by: EMERGENCY MEDICINE

## 2018-02-25 PROCEDURE — 73701 CT LOWER EXTREMITY W/DYE: CPT

## 2018-02-25 RX ORDER — CEPHALEXIN 250 MG/1
500 CAPSULE ORAL EVERY 8 HOURS SCHEDULED
Qty: 21 CAPSULE | Refills: 0 | Status: SHIPPED | OUTPATIENT
Start: 2018-02-25 | End: 2018-03-04

## 2018-02-25 RX ADMIN — IOHEXOL 100 ML: 350 INJECTION, SOLUTION INTRAVENOUS at 17:46

## 2018-02-25 NOTE — ED NOTES
Ortho paged and aware that all pt testing is complete, they will be down to see pt       Martha Manzo RN  02/25/18 1371

## 2018-02-25 NOTE — ED ATTENDING ATTESTATION
Juan Ibrahim MD, saw and evaluated the patient  I have discussed the patient with the resident/non-physician practitioner and agree with the resident's/non-physician practitioner's findings, Plan of Care, and MDM as documented in the resident's/non-physician practitioner's note, except where noted  All available labs and Radiology studies were reviewed  At this point I agree with the current assessment done in the Emergency Department  I have conducted an independent evaluation of this patient a history and physical is as follows:      Critical Care Time  CritCare Time  OA: This is an evaluation of a 28-year-old male status post right hip revision surgery approximately 1 month ago who presents to the emergency department complaining of 1 and half weeks of erythema and discomfort at incision site  He states that the erythema has been relatively unchanged for the past week and a half but decided to present today for evaluation after talking to on-call orthopedics  He denies any associated fevers or chills  No dizziness or lightheadedness  No chest pain or shortness of breath  No abdominal pain  No nausea no vomiting  Otherwise is feeling well  He is nonweightbearing in that extremity at this time given new fracture that was identified on CT scan approximately 2 weeks ago  Patient did call the orthopedic office and was advised to come to the emergency department for evaluation of his symptoms today  On exam, the patient is in no acute distress sitting upright in bed  Vital signs are stable  HEENT is normocephalic and atraumatic  Moist mucous membranes  Neck is supple  Heart is regular rate  Lungs clear auscultation bilateral   Abdomen is soft nontender with positive bowel sounds  Patient has intact range of motion of lower extremity with intact sensation intact strength and distal pulses and capillary refill    Patient has intact incision  to the right lateral hip at incision site with exception of very small area less than 1 cm of dehiscence  There is no discharge or bleeding  There is mild associated erythema to distal incision site  Mildly tender to palpation  No induration or fluctuance appreciated  Intact distal pulses  Assessment and plan hip pain at incision with mild erythema  Will obtain x-ray basic blood work and discussed with Orthopedics  Portions of the record may have been created with voice recognition software  Occasional wrong word or sound-a-like" substitutions may have occurred due to the inherent limitations of voice recognition software  Review chart carefully and recognize, using context, where substitutions have occurred     Procedures

## 2018-02-25 NOTE — CONSULTS
Orthopedics   Abe Vargas 28 y o  male MRN: [id number]  Unit/Bed#: X ray      Chief Complaint:   right hip pain and erythema    HPI:   28 y o male s/p ORIF R femoral neck on 7/25/2017 and removal of hardware 1/22/2018 with a post-operative course complicated by constant pain presents to the ED with complaints of continued pain and incisional erythema  Patient 1st saw this erythema on Wednesday and notes that it has gotten worse since then  He has never seen this before in his postoperative course  He does not know any fevers, sweats, chills, or P sick recently  He notes no sick contacts recently  No nausea, vomiting, or other associated symptoms  Pain in his right hip continues to be at a high level  Review Of Systems:   · Skin: Normal  · Neuro: See HPI  · Musculoskeletal: See HPI  · 14 point review of systems Spine    Past Medical History:   Past Medical History:   Diagnosis Date    Anxiety     Depression        Past Surgical History:   Past Surgical History:   Procedure Laterality Date    ORIF HIP FRACTURE Right 1/22/2018    Procedure: HIP REMOVAL OF CANNULATED SCREW AND POSSIBLE RE-INSERTION ;  Surgeon: Mariely Bhakta MD;  [facility];  Service: Orthopedics       Family History:  Family history reviewed and non-contributory  History reviewed  No pertinent family history      Social History:  Social History     Social History    Marital status: Single     Spouse name: N/A    Number of children: N/A    Years of education: N/A     Social History Main Topics    Smoking status: Former Smoker     Packs/day: 0 50    Smokeless tobacco: Never Used    Alcohol use Yes      Comment: social    Drug use: No    Sexual activity: Not Asked     Other Topics Concern    None     Social History Narrative    None       Allergies:   No Known Allergies        Labs:    0  Lab Value Date/Time   HCT 43 2 02/25/2018 1510   HCT 38 2 07/27/2017 0712   HCT 39 7 07/26/2017 0504   HGB 15 1 02/25/2018 1510   HGB 12 9 07/27/2017 0712   HGB 13 8 07/26/2017 0504   INR 1 02 07/25/2017 1702   WBC 9 02 02/25/2018 1510   WBC 9 04 07/27/2017 0712   WBC 9 19 07/26/2017 0504       Meds:  No current facility-administered medications for this encounter  Current Outpatient Prescriptions:     cholecalciferol (VITAMIN D3) 1,000 units tablet, Take 1,000 Units by mouth, Disp: , Rfl:     citalopram (CeleXA) 40 mg tablet, Take by mouth, Disp: , Rfl:     clonazePAM (KlonoPIN) 0 25 MG disintegrating tablet, TAKE 2 TABLETS EVERY 8 HOURS, Disp: , Rfl: 0    oxyCODONE (ROXICODONE) 5 mg immediate release tablet, Take 1-2 tablets PO q 4-6 hours PRN pain, Disp: 60 tablet, Rfl: 0    docusate sodium (COLACE) 100 mg capsule, TAKE 1 CAPSULES BY MOUTH 2 (TWO) TIMES A DAY AS NEEDED FOR CONSTIPATION, Disp: , Rfl: 0    ibuprofen (MOTRIN) 800 mg tablet, Take 1 tablet (800 mg total) by mouth every 8 (eight) hours, Disp: 30 tablet, Rfl: 0    methocarbamol (ROBAXIN) 750 mg tablet, Take 1 tablet by mouth every 6 (six) hours as needed, Disp: , Rfl:     Blood Culture:   No results found for: BLOODCX    Wound Culture:   No results found for: WOUNDCULT    Ins and Outs:  No intake/output data recorded  Physical Exam:   /92 (BP Location: Right arm)   Pulse (!) 113   Temp 98 3 °F (36 8 °C) (Oral)   Resp 20   Wt 90 7 kg (200 lb)   SpO2 97%   BMI 31 32 kg/m²   Gen: Alert and oriented to person, place, time  HEENT: EOMI, eyes clear, moist mucus membranes, hearing intact  Respiratory: Bilateral chest rise  No audible wheezing found  Cardiovascular: Regular Rate and Rhythm  Abdomen: soft nontender/nondistended  Musculoskeletal: right lower extremity  · Skin intact with erythema over incision  Small amount of purulent drainage expressed from wound  · Tender to palpation over hip  · Positive log roll  · Sensation intact L2-S1  · Positive ankle dorsi/plantar flexion, EHL/FHL  · 2+ DP pulse    Radiology:   I personally reviewed the films    X-rays right hip shows stable right with DHS hardware in place and CT scan shows seroma with no sign of abscess currently  Findings discussed with radiology department      Assessment:  28 y o male status post ORIF right femoral neck fracture and subsequent LAURO for pain with right incisional cellulitis and drainage    Plan:   · Non weight bearing right lower extremity  · Analgesia in the ED for pain  · Keflex for 7 days  · Follow up as scheduled in clinic    Jamil Penny MD

## 2018-02-26 NOTE — DISCHARGE INSTRUCTIONS
Cellulitis, Ambulatory Care   GENERAL INFORMATION:   Cellulitis  is a skin infection caused by bacteria  Common symptoms include the following:   · Fever    · A red, warm, swollen area on your skin    · Pain when the area is touched    · Bumps or blisters (abscess) that may drain pus    · Bumpy, raised skin that feels like an orange peel  Seek immediate care for the following symptoms:   · An increase in pain, redness, warmth, and size    · Red streaks coming from the infected area    · A thin, gray-brown discharge coming from your infected skin area    · A crackling under your skin when you touch it    · Purple dots or bumps on your skin, or bleeding under your skin    · New swelling and pain in your legs    · Sudden trouble breathing or chest pain  Treatment for cellulitis  may include medicines to treat the bacterial infection or decrease pain  The infection may need to be cleaned out  Damaged, dead, or infected tissue may need to be cut away to help your wound heal   Manage your symptoms:   · Elevate your wound above the level of your heart  as often as you can  This will help decrease swelling and pain  Prop your wound on pillows or blankets to keep it elevated comfortably  · Clean your wound as directed  You may need to wash the wound with soap and water  Look for signs of infection  · Wear pressure stockings as directed  The stockings are tight and put pressure on your legs  This improves blood flow and decreases swelling  Prevent cellulitis:   · Wash your hands often  Use soap and water  Wash your hands after you use the bathroom, change a child's diapers, or sneeze  Wash your hands before you prepare or eat food  Use lotion to prevent dry, cracked skin  · Do not share personal items, such as towels, clothing, and razors  · Clean exercise equipment  with germ-killing  before and after you use it    Follow up with your healthcare provider as directed:  Write down your questions so you remember to ask them during your visits  CARE AGREEMENT:   You have the right to help plan your care  Learn about your health condition and how it may be treated  Discuss treatment options with your caregivers to decide what care you want to receive  You always have the right to refuse treatment  The above information is an  only  It is not intended as medical advice for individual conditions or treatments  Talk to your doctor, nurse or pharmacist before following any medical regimen to see if it is safe and effective for you  © 2014 [address] is for End User's use only and may not be sold, redistributed or otherwise used for commercial purposes  All illustrations and images included in CareNotes® are the copyrighted property of [employer]  or [employer]

## 2018-02-28 ENCOUNTER — TELEPHONE (OUTPATIENT)
Dept: OBGYN CLINIC | Facility: HOSPITAL | Age: 33
End: 2018-02-28

## 2018-02-28 NOTE — TELEPHONE ENCOUNTER
Call from patient   Call back  # [phone]  Dr Pedro Luis Salcedo     Patient is requesting Oxycodone 5MG, patient has 3 tablets left

## 2018-02-28 NOTE — ED PROVIDER NOTES
History  Chief Complaint   Patient presents with    Hip Pain     S/P right hip surgery 01/23/18 states the site is  warm to touch , painful at times     HPI     51-year-old male with history of hip fracture her last year with subsequent revision in January by Yanet Wilkinson presents for reddening around his wound and incision on the right  Onset was one week ago  Complains of erythema and warmth and pain at the site  States that it has been unchanged for the past week but he is concerned so he called the orthopedics doctor on call who sent him in for further evaluation  Denies any fevers or other systemic symptoms  Is able to walk but is supposed to stay off that limb  Denies abdominal pain nausea vomiting diarrhea  Moderate severity  No other modifying factors  No other associated symptoms  On exam there is a small linear incision that is intact with some purulent discharge  There is a small amount of erythema  He has soft compartments no other associated findings  Distal neurovascularly intact  Assessment plan: Wound infection  Consult  Orthopedics labs  Prior to Admission Medications   Prescriptions Last Dose Informant Patient Reported? Taking?    cholecalciferol (VITAMIN D3) 1,000 units tablet Unknown at Unknown time  Yes No   Sig: Take 1,000 Units by mouth   citalopram (CeleXA) 40 mg tablet 2/25/2018 at Unknown time  Yes Yes   Sig: Take by mouth   clonazePAM (KlonoPIN) 0 25 MG disintegrating tablet 2/25/2018 at Unknown time  Yes Yes   Sig: TAKE 2 TABLETS EVERY 8 HOURS   docusate sodium (COLACE) 100 mg capsule Unknown at Unknown time  Yes No   Sig: TAKE 1 CAPSULES BY MOUTH 2 (TWO) TIMES A DAY AS NEEDED FOR CONSTIPATION   ibuprofen (MOTRIN) 800 mg tablet Unknown at Unknown time  No No   Sig: Take 1 tablet (800 mg total) by mouth every 8 (eight) hours   methocarbamol (ROBAXIN) 750 mg tablet Unknown at Unknown time  Yes No   Sig: Take 1 tablet by mouth every 6 (six) hours as needed   oxyCODONE (ROXICODONE) 5 mg immediate release tablet 2/25/2018 at Unknown time  No Yes   Sig: Take 1-2 tablets PO q 4-6 hours PRN pain      Facility-Administered Medications: None       Past Medical History:   Diagnosis Date    Anxiety     Depression        Past Surgical History:   Procedure Laterality Date    ORIF HIP FRACTURE Right 1/22/2018    Procedure: HIP REMOVAL OF CANNULATED SCREW AND POSSIBLE RE-INSERTION ;  Surgeon: Heladio Ivy MD;  [facility];  Service: Orthopedics       History reviewed  No pertinent family history  I have reviewed and agree with the history as documented  Social History   Substance Use Topics    Smoking status: Former Smoker     Packs/day: 0 50    Smokeless tobacco: Never Used    Alcohol use Yes      Comment: social        Review of Systems   Constitutional: Negative for chills, fatigue and fever  Eyes: Negative for photophobia and visual disturbance  Respiratory: Negative for cough and shortness of breath  Cardiovascular: Negative for chest pain, palpitations and leg swelling  Gastrointestinal: Negative for diarrhea, nausea and vomiting  Endocrine: Negative for polydipsia and polyuria  Genitourinary: Negative for decreased urine volume, difficulty urinating, dysuria and frequency  Musculoskeletal: Negative for back pain, neck pain and neck stiffness  Skin: Positive for wound  Negative for color change and rash  Allergic/Immunologic: Negative for environmental allergies and immunocompromised state  Neurological: Negative for dizziness and headaches  Hematological: Negative for adenopathy  Does not bruise/bleed easily  Psychiatric/Behavioral: Negative for dysphoric mood  The patient is not nervous/anxious          Physical Exam  ED Triage Vitals   Temperature Pulse Respirations Blood Pressure SpO2   02/25/18 1428 02/25/18 1428 02/25/18 1428 02/25/18 1428 02/25/18 1428   98 3 °F (36 8 °C) (!) 113 20 151/92 97 %      Temp Source Heart Rate Source Patient Position - Orthostatic VS BP Location FiO2 (%)   02/25/18 1428 02/25/18 1550 02/25/18 1550 02/25/18 1428 --   Oral Monitor Lying Right arm       Pain Score       02/25/18 1410       No Pain           Orthostatic Vital Signs  Vitals:    02/25/18 1550 02/25/18 1729 02/25/18 1845 02/25/18 1927   BP: 120/63 118/70 127/72 130/59   Pulse: 78 81 75 77   Patient Position - Orthostatic VS: Lying  Lying Lying       Physical Exam   Constitutional: He is oriented to person, place, and time  He appears well-developed  HENT:   Head: Normocephalic and atraumatic  Right Ear: External ear normal    Left Ear: External ear normal    Mouth/Throat: Oropharynx is clear and moist    Eyes: Conjunctivae and EOM are normal  Pupils are equal, round, and reactive to light  Neck: Normal range of motion  Neck supple  No JVD present  No thyromegaly present  Cardiovascular: Normal rate, regular rhythm and normal heart sounds  Exam reveals no gallop and no friction rub  No murmur heard  Pulmonary/Chest: Effort normal and breath sounds normal  No respiratory distress  He has no wheezes  He has no rales  Abdominal: Soft  Bowel sounds are normal  He exhibits no distension  There is no rebound and no guarding  Musculoskeletal: He exhibits tenderness  He exhibits no edema  Lymphadenopathy:     He has no cervical adenopathy  Neurological: He is alert and oriented to person, place, and time  No cranial nerve deficit  Skin: Skin is warm  Psychiatric: He has a normal mood and affect  His behavior is normal    Nursing note and vitals reviewed        ED Medications  Medications   iohexol (OMNIPAQUE) 350 MG/ML injection (SINGLE-DOSE) 100 mL (100 mL Intravenous Given 2/25/18 5006)       Diagnostic Studies  Results Reviewed     Procedure Component Value Units Date/Time    Blood culture #1 [[id number]] Collected:  02/25/18 1510    Lab Status:  Preliminary result Specimen:  Blood from Arm, Left Updated:  02/27/18 2001     Blood Culture No Growth at 48 hrs  Blood culture #2 [[id number]] Collected:  02/25/18 1513    Lab Status:  Preliminary result Specimen:  Blood from Arm, Right Updated:  02/27/18 2001     Blood Culture No Growth at 48 hrs  Sedimentation rate, automated [[id number]]  (Abnormal) Collected:  02/25/18 1515    Lab Status:  Final result Specimen:  Blood from Arm, Left Updated:  02/25/18 1634     Sed Rate 16 (H) mm/hour     C-reactive protein [[id number]]  (Abnormal) Collected:  02/25/18 1515    Lab Status:  Final result Specimen:  Blood from Vein Updated:  02/25/18 1559     CRP 3 7 (H) mg/L     Basic metabolic panel [[id number]] Collected:  02/25/18 1510    Lab Status:  Final result Specimen:  Blood from Arm, Left Updated:  02/25/18 1540     Sodium 137 mmol/L      Potassium 3 9 mmol/L      Chloride 104 mmol/L      CO2 27 mmol/L      Anion Gap 6 mmol/L      BUN 14 mg/dL      Creatinine 0 93 mg/dL      Glucose 78 mg/dL      Calcium 9 1 mg/dL      eGFR 108 ml/min/1 73sq m     Narrative:         National Kidney Disease Education Program recommendations are as follows:  GFR calculation is accurate only with a steady state creatinine  Chronic Kidney disease less than 60 ml/min/1 73 sq  meters  Kidney failure less than 15 ml/min/1 73 sq  meters      CBC and differential [[id number]]  (Normal) Collected:  02/25/18 1510    Lab Status:  Final result Specimen:  Blood from Arm, Left Updated:  02/25/18 1524     WBC 9 02 Thousand/uL      RBC 4 59 Million/uL      Hemoglobin 15 1 g/dL      Hematocrit 43 2 %      MCV 94 fL      MCH 32 9 pg      MCHC 35 0 g/dL      RDW 12 3 %      MPV 9 2 fL      Platelets 360 Thousands/uL      nRBC 0 /100 WBCs      Neutrophils Relative 51 %      Lymphocytes Relative 36 %      Monocytes Relative 11 %      Eosinophils Relative 2 %      Basophils Relative 0 %      Neutrophils Absolute 4 54 Thousands/µL      Lymphocytes Absolute 3 27 Thousands/µL      Monocytes Absolute 0 95 Thousand/µL      Eosinophils Absolute 0 20 Thousand/µL      Basophils Absolute 0 03 Thousands/µL                  CT hip right w contrast   Final Result by Danae Block MD (02/26 1096)      1  Unchanged postsurgical appearance after dynamic hip screw fixation of right subcapital fracture  No evidence for hardware infection, abscess, or osteomyelitis  2   Articular surface indentation superiorly could be due to to mild varus alignment or subarticular cortical collapse  No progression from prior  3   Mild central osseous bridging at the fracture plane  Workstation performed: [id number]         XR hip/pelv 2-3 vws right if performed   Final Result by Danyelle Hardy MD (02/26 0954)      Stable alignment  Workstation performed: [id number]               Procedures  Procedures      Phone Consults  ED Phone Contact    ED Course  ED Course as of Feb 28 0301   Bren Kay Feb 25, 2018 1926 Awaiting ortho disposition                                MDM  Number of Diagnoses or Management Options  Wound cellulitis: new and requires workup  Diagnosis management comments: Patient was seen and evaluated by the orthopedic resident    He was discharged by the orthopedic resident with Keflex       Amount and/or Complexity of Data Reviewed  Clinical lab tests: reviewed and ordered  Tests in the radiology section of CPT®: reviewed and ordered  Discuss the patient with other providers: yes (orthpedics)  Independent visualization of images, tracings, or specimens: yes      CritCare Time    Disposition  Final diagnoses:   Wound cellulitis     Time reflects when diagnosis was documented in both MDM as applicable and the Disposition within this note     Time User Action Codes Description Comment    2/25/2018  3:24 PM [address] [place] [address] Abbi [address] [Z47 89] Aftercare following surgery of the musculoskeletal system     2/25/2018  3:24 PM [address] [place] [address], [address] [place] [address] Road [U00 21] Aftercare following surgery of the musculoskeletal system     2/25/2018  3:24 PM [address] [place] [address] [Z47 89] Aftercare following surgery of the musculoskeletal system     2/25/2018  7:41 PM Paola Mike Add [L03 90] Wound cellulitis       ED Disposition     ED Disposition Condition Comment    Discharge  Abe Orellana discharge to home/self care  Condition at discharge: Good        Follow-up Information     Follow up With Specialties Details Why [address] [facility]   [address]      Samuel Arguelles MD Orthopedic Surgery  As needed [address]  [phone]          Discharge Medication List as of 2/25/2018  7:42 PM      START taking these medications    Details   cephalexin (KEFLEX) 250 mg capsule Take 2 capsules (500 mg total) by mouth every 8 (eight) hours for 7 days, Starting Sun 2/25/2018, Until Sun 3/4/2018, Normal         CONTINUE these medications which have NOT CHANGED    Details   citalopram (CeleXA) 40 mg tablet Take by mouth, Historical Med      clonazePAM (KlonoPIN) 0 25 MG disintegrating tablet TAKE 2 TABLETS EVERY 8 HOURS, Historical Med      oxyCODONE (ROXICODONE) 5 mg immediate release tablet Take 1-2 tablets PO q 4-6 hours PRN pain, Print      cholecalciferol (VITAMIN D3) 1,000 units tablet Take 1,000 Units by mouth, Starting Mon 3/6/2017, Historical Med      docusate sodium (COLACE) 100 mg capsule TAKE 1 CAPSULES BY MOUTH 2 (TWO) TIMES A DAY AS NEEDED FOR CONSTIPATION, Historical Med      ibuprofen (MOTRIN) 800 mg tablet Take 1 tablet (800 mg total) by mouth every 8 (eight) hours, Starting Fri 2/2/2018, Normal      methocarbamol (ROBAXIN) 750 mg tablet Take 1 tablet by mouth every 6 (six) hours as needed, Starting Tue 8/15/2017, Historical Med           No discharge procedures on file  ED Provider  Attending physically available and evaluated Odalys Gallego I managed the patient along with the ED Attending      Electronically Signed by         Valeriy Marin DO  02/28/18 9824

## 2018-03-01 ENCOUNTER — TELEPHONE (OUTPATIENT)
Dept: OBGYN CLINIC | Facility: HOSPITAL | Age: 33
End: 2018-03-01

## 2018-03-01 DIAGNOSIS — Z98.890 STATUS POST HARDWARE REMOVAL: ICD-10-CM

## 2018-03-01 RX ORDER — OXYCODONE HYDROCHLORIDE 5 MG/1
TABLET ORAL
Qty: 60 TABLET | Refills: 0 | Status: SHIPPED | OUTPATIENT
Start: 2018-03-01 | End: 2018-03-06 | Stop reason: SDUPTHER

## 2018-03-01 NOTE — TELEPHONE ENCOUNTER
Patient advised to go to ER should symptoms increase  Appt rescheduled for Tuesday 3/6 9:15am in [facility]

## 2018-03-01 NOTE — TELEPHONE ENCOUNTER
Dr Sanchez Wayne patient - Femur Fx ORIF 1/22/18  with Cellulitis 2/25    Patient was seen in ED and ordered Keflex 500mg every 8 hrs  Patient was only taking the keflex twice daily  He will start taking it three x daily  He is changing his dressing once daily and he still is having some purulent drainage  He is asking if he should be cleansing the wound with alcohol swabs  He will complete the course of abx and let us know if he is still having symptoms  He has a follow up on 3/15  Please advise

## 2018-03-01 NOTE — TELEPHONE ENCOUNTER
Patient advised not to cleanse with alcohol swab  Advised just to change dressing with Dry sterile dressing daily and PRN  Patient has a work conflict and would appreciate if you can see him on Monday  If not he will try to switch with someone to be seen tomorrow  Please advise

## 2018-03-01 NOTE — TELEPHONE ENCOUNTER
I do not have office hours on Monday but I can see him on Tuesday   Please advise him for s/s of worsening infection which he would need to go to the ER for

## 2018-03-02 LAB
BACTERIA BLD CULT: NORMAL
BACTERIA BLD CULT: NORMAL

## 2018-03-06 ENCOUNTER — OFFICE VISIT (OUTPATIENT)
Dept: OBGYN CLINIC | Facility: HOSPITAL | Age: 33
End: 2018-03-06

## 2018-03-06 VITALS
SYSTOLIC BLOOD PRESSURE: 123 MMHG | DIASTOLIC BLOOD PRESSURE: 82 MMHG | BODY MASS INDEX: 33.12 KG/M2 | HEART RATE: 90 BPM | HEIGHT: 67 IN | WEIGHT: 211 LBS

## 2018-03-06 DIAGNOSIS — Z98.890 STATUS POST HARDWARE REMOVAL: ICD-10-CM

## 2018-03-06 DIAGNOSIS — Z47.89 AFTERCARE FOLLOWING SURGERY OF THE MUSCULOSKELETAL SYSTEM: Primary | ICD-10-CM

## 2018-03-06 PROCEDURE — 99024 POSTOP FOLLOW-UP VISIT: CPT | Performed by: PHYSICIAN ASSISTANT

## 2018-03-06 RX ORDER — OXYCODONE HYDROCHLORIDE 5 MG/1
TABLET ORAL
Qty: 60 TABLET | Refills: 0 | Status: SHIPPED | OUTPATIENT
Start: 2018-03-08 | End: 2018-03-15 | Stop reason: SDUPTHER

## 2018-03-08 NOTE — PROGRESS NOTES
28 y o male presenting to the office for evaluation of cellulitis at the surgical incision site  Patient states that the redness and drainage have improved  He completed his Keflex antibiotics as directed  Patient states that the pain has improved now that he is nonweightbearing on the right lower extremity  He denies any numbness/tingling or any other new/associated complaints  Review of Systems  Review of systems negative unless otherwise specified in HPI    Past Medical History  Past Medical History:   Diagnosis Date    Anxiety     Depression        Past Surgical History  Past Surgical History:   Procedure Laterality Date    ORIF HIP FRACTURE Right 1/22/2018    Procedure: HIP REMOVAL OF CANNULATED SCREW AND POSSIBLE RE-INSERTION ;  Surgeon: Gianluca Julian MD;  [facility];  Service: Orthopedics       Current Medications  Current Outpatient Prescriptions on File Prior to Visit   Medication Sig Dispense Refill    cholecalciferol (VITAMIN D3) 1,000 units tablet Take 1,000 Units by mouth      citalopram (CeleXA) 40 mg tablet Take by mouth      clonazePAM (KlonoPIN) 0 25 MG disintegrating tablet TAKE 2 TABLETS EVERY 8 HOURS  0    docusate sodium (COLACE) 100 mg capsule TAKE 1 CAPSULES BY MOUTH 2 (TWO) TIMES A DAY AS NEEDED FOR CONSTIPATION  0    ibuprofen (MOTRIN) 800 mg tablet Take 1 tablet (800 mg total) by mouth every 8 (eight) hours 30 tablet 0    methocarbamol (ROBAXIN) 750 mg tablet Take 1 tablet by mouth every 6 (six) hours as needed       No current facility-administered medications on file prior to visit          Recent Labs [facility])    0  Lab Value Date/Time   HCT 43 2 02/25/2018 1510   HGB 15 1 02/25/2018 1510   WBC 9 02 02/25/2018 1510   INR 1 02 07/25/2017 1702   ESR 16 (H) 02/25/2018 1515   CRP 3 7 (H) 02/25/2018 1515   GLUCOSE 78 02/25/2018 1510         Physical exam  · General: Awake, Alert, Oriented  · Eyes: Pupils equal, round and reactive to light  · Heart: regular rate and rhythm  · Lungs: No audible wheezing  · Abdomen: soft  right Lower extremity  · Surgical incision with a small area of drainage  There is a Vicryl stitch coming out through the skin  This appears to be what is irritating it  · Nontender to palpation  · Range of motion at acceptable levels  · Strength acceptable level    Imaging  CT scan demonstrates some bone bridging at the fracture site    Procedure  Removal of visible suture    Assessment/Plan:   32 y o male with improving cellulitis of wound  Patient to continue nonweightbearing activities to the right lower extremity  He is to follow up on 3/15/18 for his regularly scheduled appointment with Dr Anjali Ordonez    Patient is aware that 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

## 2018-03-15 ENCOUNTER — HOSPITAL ENCOUNTER (OUTPATIENT)
Dept: RADIOLOGY | Facility: HOSPITAL | Age: 33
Discharge: HOME | End: 2018-03-15
Attending: ORTHOPAEDIC SURGERY
Payer: COMMERCIAL

## 2018-03-15 ENCOUNTER — OFFICE VISIT (OUTPATIENT)
Dept: OBGYN CLINIC | Facility: HOSPITAL | Age: 33
End: 2018-03-15

## 2018-03-15 VITALS — DIASTOLIC BLOOD PRESSURE: 83 MMHG | SYSTOLIC BLOOD PRESSURE: 124 MMHG | HEART RATE: 90 BPM

## 2018-03-15 DIAGNOSIS — Z47.89 AFTERCARE FOLLOWING SURGERY OF THE MUSCULOSKELETAL SYSTEM: ICD-10-CM

## 2018-03-15 DIAGNOSIS — Z48.89 AFTERCARE FOLLOWING SURGERY: ICD-10-CM

## 2018-03-15 DIAGNOSIS — Z98.890 STATUS POST HARDWARE REMOVAL: ICD-10-CM

## 2018-03-15 DIAGNOSIS — Z48.89 AFTERCARE FOLLOWING SURGERY: Primary | ICD-10-CM

## 2018-03-15 PROCEDURE — 99024 POSTOP FOLLOW-UP VISIT: CPT | Performed by: ORTHOPAEDIC SURGERY

## 2018-03-15 PROCEDURE — 73502 X-RAY EXAM HIP UNI 2-3 VIEWS: CPT

## 2018-03-15 RX ORDER — OXYCODONE HYDROCHLORIDE 5 MG/1
TABLET ORAL
Qty: 60 TABLET | Refills: 0 | Status: SHIPPED | OUTPATIENT
Start: 2018-03-15 | End: 2018-03-27 | Stop reason: SDUPTHER

## 2018-03-15 NOTE — PROGRESS NOTES
28 y o male presenting to the office for follow-up on Open Reduction and Internal Fixation of the right hip  Patient states that the pain has improved and is now occurring intermittently rather than constantly  He is still taking oxycodone regularly for the pain, about 6 tablets a day  Patient is continuing nonweightbearing activities for the right lower extremity at this time  He states that the wound has healed at this time and denies any signs symptoms of an infection  He denies any new or associated complaints  Review of Systems  Review of systems negative unless otherwise specified in HPI    Past Medical History  Past Medical History:   Diagnosis Date    Anxiety     Depression        Past Surgical History  Past Surgical History:   Procedure Laterality Date    ORIF HIP FRACTURE Right 1/22/2018    Procedure: HIP REMOVAL OF CANNULATED SCREW AND POSSIBLE RE-INSERTION ;  Surgeon: Chiki Ferraro MD;  [facility];  Service: Orthopedics       Current Medications  Current Outpatient Prescriptions on File Prior to Visit   Medication Sig Dispense Refill    cholecalciferol (VITAMIN D3) 1,000 units tablet Take 1,000 Units by mouth      citalopram (CeleXA) 40 mg tablet Take by mouth      clonazePAM (KlonoPIN) 0 25 MG disintegrating tablet TAKE 2 TABLETS EVERY 8 HOURS  0    docusate sodium (COLACE) 100 mg capsule TAKE 1 CAPSULES BY MOUTH 2 (TWO) TIMES A DAY AS NEEDED FOR CONSTIPATION  0    ibuprofen (MOTRIN) 800 mg tablet Take 1 tablet (800 mg total) by mouth every 8 (eight) hours 30 tablet 0    methocarbamol (ROBAXIN) 750 mg tablet Take 1 tablet by mouth every 6 (six) hours as needed      oxyCODONE (ROXICODONE) 5 mg immediate release tablet Earliest Fill Date: 3/8/18 Take 1-2 tablets PO q 4-6 hours PRN pain 60 tablet 0     No current facility-administered medications on file prior to visit          Recent Labs [facility])    0  Lab Value Date/Time   HCT 43 2 02/25/2018 9256 HGB 15 1 02/25/2018 1510   WBC 9 02 02/25/2018 1510   INR 1 02 07/25/2017 1702   ESR 16 (H) 02/25/2018 1515   CRP 3 7 (H) 02/25/2018 1515   GLUCOSE 78 02/25/2018 1510         Physical exam  · General: Awake, Alert, Oriented  · Eyes: Pupils equal, round and reactive to light  · Heart: regular rate and rhythm  · Lungs: No audible wheezing  · Abdomen: soft  right Lower extremity  · Healing appearance of surgical incision  · Nontender to palpation  · Patient is able to forward flex his hip with some difficulty, however, there is no pain  Passive range of motion with some limitations to internal rotation  · Sensation intact  · Strength at expected levels      Imaging  Orthopedic hardware in good position  There is still a small area of fracture site visible  Procedure  None    Assessment/Plan:   28 y o male with delayed healing of his right femoral neck fracture  Patient to remain nonweightbearing for an additional 6 weeks  We will get a CT scan prior to his next visit to determine whether not he is adequately healing  Discussed with patient surgical intervention including a hip replacement with removal of hardware  Discussed with patient that at his age, he will likely need revision surgeries at least once if not twice in his lifetime due to expected life span of implants  Patient will wait until next visit to determine whether not he wants to push forward with this sooner rather than later

## 2018-03-15 NOTE — LETTER
March 15, 2018     Patient: Aliza Ibarra   YOB: 1985   Date of Visit: 3/15/2018       To Whom it May Concern:    Aliza Ibarra is under my professional care  He was seen in my office on 3/15/2018  He may return to work with limitations nonweight bearing in a wheel chair until re-evaluation in 6 weeks  If you have any questions or concerns, please don't hesitate to call           Sincerely,          Delmi Polk MD        CC: Aliza Freitass

## 2018-03-26 ENCOUNTER — TELEPHONE (OUTPATIENT)
Dept: OBGYN CLINIC | Facility: HOSPITAL | Age: 33
End: 2018-03-26

## 2018-03-26 NOTE — TELEPHONE ENCOUNTER
Dr Demetri Soler    Patient is calling for a refill on his Oxycodone 5 mg instant relief tablet  Patient states he has 5 tablets left   He will be out tomorrow as it is 1-2 tablets PO q 4-6 hours PRN for pain    Please call into [facility] [id number], [address] [address], [place]   [phone]

## 2018-03-27 DIAGNOSIS — Z98.890 STATUS POST HARDWARE REMOVAL: ICD-10-CM

## 2018-03-27 RX ORDER — OXYCODONE HYDROCHLORIDE 5 MG/1
TABLET ORAL
Qty: 60 TABLET | Refills: 0 | Status: SHIPPED | OUTPATIENT
Start: 2018-03-27 | End: 2018-04-05 | Stop reason: SDUPTHER

## 2018-03-27 NOTE — TELEPHONE ENCOUNTER
Please let him know he has to come in to get it as we do not have e-prescribing capabilities at this time

## 2018-04-03 ENCOUNTER — TELEPHONE (OUTPATIENT)
Dept: OBGYN CLINIC | Facility: HOSPITAL | Age: 33
End: 2018-04-03

## 2018-04-03 NOTE — TELEPHONE ENCOUNTER
Dr Naya Colon    Patient is calling for a refill on his Oxycodone 5 mg instant relief tablet        Patient states that he has increased pain in his R hip  He is asking if you want him to come in sooner then 5/3/18   He said that he still has not been able to do the CT

## 2018-04-03 NOTE — TELEPHONE ENCOUNTER
He needs to get the CT scan as it will be more beneficial for future planning at his next visit   Can we get it scheduled sooner and then move up his appt

## 2018-04-03 NOTE — TELEPHONE ENCOUNTER
I was able to reschedule the CT for tomorrow at 6:30am, patient is agreeable to this  There is a Thurs 2pm appt that I can schedule him for or if you have another time slot that you would make available  Patient wanted to know if he can get the oxycodone RX at [facility] office this tomorrow? Please advise

## 2018-04-04 ENCOUNTER — TELEPHONE (OUTPATIENT)
Dept: OBGYN CLINIC | Facility: HOSPITAL | Age: 33
End: 2018-04-04

## 2018-04-04 ENCOUNTER — HOSPITAL ENCOUNTER (OUTPATIENT)
Dept: CT IMAGING | Facility: HOSPITAL | Age: 33
Discharge: HOME | End: 2018-04-04
Attending: ORTHOPAEDIC SURGERY
Payer: OTHER MISCELLANEOUS

## 2018-04-04 DIAGNOSIS — Z47.89 AFTERCARE FOLLOWING SURGERY OF THE MUSCULOSKELETAL SYSTEM: ICD-10-CM

## 2018-04-04 PROCEDURE — 73700 CT LOWER EXTREMITY W/O DYE: CPT

## 2018-04-04 NOTE — TELEPHONE ENCOUNTER
I will advise patient that most likely he will need to wait until his appt tomorrow to  RX as   Is in [place]

## 2018-04-04 NOTE — TELEPHONE ENCOUNTER
Vivienne - Can you help out with this RX for Dr Chad Gomez patient - if you read below,  Brigette Menesescolo states that he can pick the RX up at [facility] before his appt on Thursday  He is out front right now waiting to  his RX ,  It is for oxycodone 5mg? Please advise

## 2018-04-04 NOTE — TELEPHONE ENCOUNTER
I do not feel comfortable filling this rx as [facility] Robb just put in an rx for #60 8 days ago and I cannot verify whether or not he picked up this rx  Also, if he is taking 6/day as per his last note, this should be lasting 10 days  We will need to contact [facility] or Dr Juan Shultz to verify if he should be receiving this prescription  Thank you

## 2018-04-05 ENCOUNTER — OFFICE VISIT (OUTPATIENT)
Dept: OBGYN CLINIC | Facility: HOSPITAL | Age: 33
End: 2018-04-05

## 2018-04-05 VITALS
HEART RATE: 76 BPM | BODY MASS INDEX: 33.11 KG/M2 | DIASTOLIC BLOOD PRESSURE: 87 MMHG | WEIGHT: 210.98 LBS | HEIGHT: 67 IN | SYSTOLIC BLOOD PRESSURE: 129 MMHG

## 2018-04-05 DIAGNOSIS — Z47.89 AFTERCARE FOLLOWING SURGERY OF THE MUSCULOSKELETAL SYSTEM: Primary | ICD-10-CM

## 2018-04-05 DIAGNOSIS — Z98.890 STATUS POST HARDWARE REMOVAL: ICD-10-CM

## 2018-04-05 PROCEDURE — 99024 POSTOP FOLLOW-UP VISIT: CPT | Performed by: ORTHOPAEDIC SURGERY

## 2018-04-05 RX ORDER — OXYCODONE HYDROCHLORIDE 5 MG/1
TABLET ORAL
Qty: 60 TABLET | Refills: 0 | Status: SHIPPED | OUTPATIENT
Start: 2018-04-05 | End: 2018-04-10 | Stop reason: SDUPTHER

## 2018-04-05 NOTE — PROGRESS NOTES
28 y o male presenting to the office for increased pain over the past week in his right hip  Patient denies any activities that specifically would have caused increased pain  He has been taking narcotic medications with 8-10 tablets of oxycodone a day as needed  Patient has remained nonweightbearing at this time  Has been able to do his work and home activities, however, the recent increase in pain has significantly limited his functional capabilities  He denies any numbness/tingling or any other acute/associated complaints  Review of Systems  Review of systems negative unless otherwise specified in HPI    Past Medical History  Past Medical History:   Diagnosis Date    Anxiety     Depression        Past Surgical History  Past Surgical History:   Procedure Laterality Date    ORIF HIP FRACTURE Right 1/22/2018    Procedure: HIP REMOVAL OF CANNULATED SCREW AND POSSIBLE RE-INSERTION ;  Surgeon: Teresa Santiago MD;  [facility];  Service: Orthopedics       Current Medications  Current Outpatient Prescriptions on File Prior to Visit   Medication Sig Dispense Refill    cholecalciferol (VITAMIN D3) 1,000 units tablet Take 1,000 Units by mouth      citalopram (CeleXA) 40 mg tablet Take by mouth      clonazePAM (KlonoPIN) 0 25 MG disintegrating tablet TAKE 2 TABLETS EVERY 8 HOURS  0    docusate sodium (COLACE) 100 mg capsule TAKE 1 CAPSULES BY MOUTH 2 (TWO) TIMES A DAY AS NEEDED FOR CONSTIPATION  0    ibuprofen (MOTRIN) 800 mg tablet Take 1 tablet (800 mg total) by mouth every 8 (eight) hours 30 tablet 0    methocarbamol (ROBAXIN) 750 mg tablet Take 1 tablet by mouth every 6 (six) hours as needed      [DISCONTINUED] oxyCODONE (ROXICODONE) 5 mg immediate release tablet Take 1-2 tablets PO q 4-6 hours PRN pain 60 tablet 0     No current facility-administered medications on file prior to visit          Recent Labs [facility])    0  Lab Value Date/Time   HCT 43 2 02/25/2018 3338 HGB 15 1 02/25/2018 1510   WBC 9 02 02/25/2018 1510   INR 1 02 07/25/2017 1702   ESR 16 (H) 02/25/2018 1515   CRP 3 7 (H) 02/25/2018 1515   GLUCOSE 78 02/25/2018 1510         Physical exam  · General: Awake, Alert, Oriented  · Eyes: Pupils equal, round and reactive to light  · Heart: regular rate and rhythm  · Lungs: No audible wheezing  · Abdomen: soft  right Lower extremity  · Right hip nontender to palpation  · Range of motion within normal limits, however, patient does have some apprehension  · Patient has adequate strength  · Sensation intact      Imaging  CT scan demonstrates orthopedic hardware in good position without a nonunion or malunion present    Procedure  None    Assessment/Plan:   32 y o male with right hip pain status post removal of screw  Discussed with patient that he she can start physical therapy and toe-touch weight-bearing with progression to 25% weight-bearing after 2 weeks  Patient to return in 4 weeks for repeat evaluation will take an additional x-ray  Patient call office or return sooner with any other concerns or complaints    Do discussed with patient his narcotic regimen  Patient would like to get off narcotics sooner rather than later  Discussed with patient that due to his high dose narcotic use he would need to taper his medications  Discussed with patient that when he is ready I will give him a tapering schedule for his medications

## 2018-04-06 ENCOUNTER — TELEPHONE (OUTPATIENT)
Dept: OBGYN CLINIC | Facility: HOSPITAL | Age: 33
End: 2018-04-06

## 2018-04-06 NOTE — TELEPHONE ENCOUNTER
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

## 2018-04-06 NOTE — TELEPHONE ENCOUNTER
Patient is calling requesting to speak with you about possibly getting a leave from work to see if that helps him heal faster  Patient feels like he is not healing as fast as he should be because he is still working      Petty Gupta pt

## 2018-04-06 NOTE — TELEPHONE ENCOUNTER
He is non-weight bearing at work and they have accommodated his needs  As long as he follows our guidelines there is no reason he should stop working

## 2018-04-10 DIAGNOSIS — Z98.890 STATUS POST HARDWARE REMOVAL: ICD-10-CM

## 2018-04-10 RX ORDER — OXYCODONE HYDROCHLORIDE 5 MG/1
TABLET ORAL
Qty: 60 TABLET | Refills: 0 | Status: SHIPPED | OUTPATIENT
Start: 2018-04-10 | End: 2018-04-16 | Stop reason: SDUPTHER

## 2018-04-10 NOTE — TELEPHONE ENCOUNTER
Amena Gilman, the note is available for the patient  Hank Chavez will not be in [place] until Thursday  Would you be able to fill the Oxycodone rx  He is getting 1-2 tablets PO q 4-6 hours PRN pain for #60   Thanks!!

## 2018-04-10 NOTE — TELEPHONE ENCOUNTER
Medication is refilled    The patient has been called  This brings closure to the medication portion of the message

## 2018-04-16 ENCOUNTER — TELEPHONE (OUTPATIENT)
Dept: OBGYN CLINIC | Facility: CLINIC | Age: 33
End: 2018-04-16

## 2018-04-16 DIAGNOSIS — Z98.890 STATUS POST HARDWARE REMOVAL: ICD-10-CM

## 2018-04-16 RX ORDER — OXYCODONE HYDROCHLORIDE 5 MG/1
TABLET ORAL
Qty: 60 TABLET | Refills: 0 | Status: SHIPPED | OUTPATIENT
Start: 2018-04-16 | End: 2018-04-24 | Stop reason: SDUPTHER

## 2018-04-16 NOTE — TELEPHONE ENCOUNTER
Abe Yadav thought he would be picking this up and doesn't use [facility] any more  Is it possible to resend it to   [place] in [place]    Thank you

## 2018-04-16 NOTE — TELEPHONE ENCOUNTER
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

## 2018-04-23 NOTE — TELEPHONE ENCOUNTER
Patient Salazar Tavares      Requestilng refill Oxycodone, said it was called into [facility] on file  Is down to one left   ASAP

## 2018-04-24 DIAGNOSIS — Z98.890 STATUS POST HARDWARE REMOVAL: ICD-10-CM

## 2018-04-24 RX ORDER — OXYCODONE HYDROCHLORIDE 5 MG/1
TABLET ORAL
Qty: 60 TABLET | Refills: 0 | Status: SHIPPED | OUTPATIENT
Start: 2018-04-24 | End: 2018-04-27 | Stop reason: SDUPTHER

## 2018-04-24 NOTE — TELEPHONE ENCOUNTER
Patient was called and is upset that this was sent to the pharmacy  Call placed to [place] as this patient became verbally abusive

## 2018-04-27 DIAGNOSIS — Z98.890 STATUS POST HARDWARE REMOVAL: ICD-10-CM

## 2018-04-27 RX ORDER — OXYCODONE HYDROCHLORIDE 5 MG/1
TABLET ORAL
Qty: 60 TABLET | Refills: 0 | Status: SHIPPED | OUTPATIENT
Start: 2018-04-29 | End: 2018-05-03 | Stop reason: SDUPTHER

## 2018-04-27 NOTE — TELEPHONE ENCOUNTER
Patient is calling    [phone]  Patient sees Dr Naya Colon    Patient is stating that he needs oxycodone refill  Patient would also like to talk to the Doctor at the next appt to get off this med  Patient is stating that his 5 day supply ends on Sunday so he would like someone to give him a call so he knows exactly when this will be called in & when he can pick it up

## 2018-04-27 NOTE — TELEPHONE ENCOUNTER
Unfortunately he will need to come to the office today or tomorrow AM to  the rx as the system to send electronic scripts is not working  The rx is ready for him to come get

## 2018-04-27 NOTE — TELEPHONE ENCOUNTER
Patient called my direct line and left message stating he called on 4/23 and requested a refill of oxy  Patient says he is out come Sunday and a rxn needs to be sent to his pharmacy today

## 2018-05-03 ENCOUNTER — OFFICE VISIT (OUTPATIENT)
Dept: OBGYN CLINIC | Facility: HOSPITAL | Age: 33
End: 2018-05-03
Payer: OTHER MISCELLANEOUS

## 2018-05-03 ENCOUNTER — HOSPITAL ENCOUNTER (OUTPATIENT)
Dept: RADIOLOGY | Facility: HOSPITAL | Age: 33
Discharge: HOME | End: 2018-05-03
Attending: ORTHOPAEDIC SURGERY
Payer: COMMERCIAL

## 2018-05-03 DIAGNOSIS — Z98.890 STATUS POST HARDWARE REMOVAL: ICD-10-CM

## 2018-05-03 DIAGNOSIS — Z48.89 AFTERCARE FOLLOWING SURGERY: ICD-10-CM

## 2018-05-03 DIAGNOSIS — Z48.89 AFTERCARE FOLLOWING SURGERY: Primary | ICD-10-CM

## 2018-05-03 PROCEDURE — 73502 X-RAY EXAM HIP UNI 2-3 VIEWS: CPT

## 2018-05-03 PROCEDURE — 99213 OFFICE O/P EST LOW 20 MIN: CPT | Performed by: ORTHOPAEDIC SURGERY

## 2018-05-03 RX ORDER — OXYCODONE HYDROCHLORIDE 5 MG/1
TABLET ORAL
Qty: 60 TABLET | Refills: 0 | Status: SHIPPED | OUTPATIENT
Start: 2018-05-03 | End: 2018-05-08 | Stop reason: SDUPTHER

## 2018-05-03 NOTE — PROGRESS NOTES
28 y o male presents to the office roughly 3 months status post right femoral neck fracture fixation on 01/22/2018  He is still experiencing groin pain  He is unable to perform his job duties at this time  He has not been able to weight bear due to his pain  He continues to need narcotics to take the edge off  Review of Systems  Review of systems negative unless otherwise specified in HPI    Past Medical History  Past Medical History:   Diagnosis Date    Anxiety     Depression        Past Surgical History  Past Surgical History:   Procedure Laterality Date    ORIF HIP FRACTURE Right 1/22/2018    Procedure: HIP REMOVAL OF CANNULATED SCREW AND POSSIBLE RE-INSERTION ;  Surgeon: Madeleine Cotto MD;  [facility];  Service: Orthopedics       Current Medications  Current Outpatient Prescriptions on File Prior to Visit   Medication Sig Dispense Refill    cholecalciferol (VITAMIN D3) 1,000 units tablet Take 1,000 Units by mouth      citalopram (CeleXA) 40 mg tablet Take by mouth      clonazePAM (KlonoPIN) 0 25 MG disintegrating tablet TAKE 2 TABLETS EVERY 8 HOURS  0    docusate sodium (COLACE) 100 mg capsule TAKE 1 CAPSULES BY MOUTH 2 (TWO) TIMES A DAY AS NEEDED FOR CONSTIPATION  0    ibuprofen (MOTRIN) 800 mg tablet Take 1 tablet (800 mg total) by mouth every 8 (eight) hours 30 tablet 0    methocarbamol (ROBAXIN) 750 mg tablet Take 1 tablet by mouth every 6 (six) hours as needed      oxyCODONE (ROXICODONE) 5 mg immediate release tablet Earliest Fill Date: 4/29/18 Take 1-2 tablets PO q 4-6 hours PRN pain 60 tablet 0     No current facility-administered medications on file prior to visit          Recent Labs [facility])    0  Lab Value Date/Time   HCT 43 2 02/25/2018 1510   HGB 15 1 02/25/2018 1510   WBC 9 02 02/25/2018 1510   INR 1 02 07/25/2017 1702   ESR 16 (H) 02/25/2018 1515   CRP 3 7 (H) 02/25/2018 1515   GLUCOSE 78 02/25/2018 1510         Physical exam  · General: Awake, Alert, Oriented  · Eyes: Pupils equal, round and reactive to light  · Heart: regular rate and rhythm  · Lungs: No audible wheezing  · Abdomen: soft  right hip  · Patient ambulates with the assistance of crutches  · Painful ROM  · Incisions well healed      Imaging  X-rays of right hip were reviewed    Procedure  None    Assessment/Plan:   32 y o male will proceed with removal of hardware and right posterior total hip arthroplasty  The risks of the procedure were discussed in detail  He will start a titration plan for his medication before the surgery  We will see him back as a post-operative patient

## 2018-05-03 NOTE — PATIENT INSTRUCTIONS
Titration plan for Narcotics:     Patient currently taking 9-11 tablets daily  - take ONLY 8 tablets x 3 days  - take ONLY 7 tablets x 3 days  - take ONLY 6 tablets x 3 days  - take ONLY 5 tablets x 3 days  - take ONLY 4 tablets x 2 days  - take ONLY 3 tablets x 2 days  - take ONLY 2 tablets x 2 days  - take ONLY 1 tablet x 2 days  - Then STOP     Patient to have surgery in 3 weeks

## 2018-05-03 NOTE — LETTER
May 3, 2018     Patient: Rudi Keller   YOB: 1985   Date of Visit: 5/3/2018       To Whom it May Concern:    Rudi Keller is under my professional care  He was seen in my office on 5/3/2018  No work for 10 weeks  If you have any questions or concerns, please don't hesitate to call           Sincerely,          Koffi Ernandez MD        CC: No Recipients

## 2018-05-07 NOTE — TELEPHONE ENCOUNTER
Patient is calling for a refill on oxycodone  Patient is also stating that he is on a weaning schedule and he is having a lot of pain  Patient is wondering if he can wait to wean off the medication til after his hip replacement surgery      Gonzalez Cortez pt

## 2018-05-08 DIAGNOSIS — Z98.890 STATUS POST HARDWARE REMOVAL: ICD-10-CM

## 2018-05-08 RX ORDER — OXYCODONE HYDROCHLORIDE 5 MG/1
TABLET ORAL
Qty: 60 TABLET | Refills: 0 | Status: SHIPPED | OUTPATIENT
Start: 2018-05-08 | End: 2018-05-08 | Stop reason: SDUPTHER

## 2018-05-08 RX ORDER — OXYCODONE HYDROCHLORIDE 5 MG/1
TABLET ORAL
Qty: 60 TABLET | Refills: 0 | Status: SHIPPED | OUTPATIENT
Start: 2018-05-08 | End: 2018-05-12 | Stop reason: SDUPTHER

## 2018-05-09 NOTE — TELEPHONE ENCOUNTER
Caller- dr Evelyne Nicole patient  Ph- [phone]  Patient was told to call a few days in advance for a post dated script to  on Friday for oxycodone  He is asking for a refill  He spoke to [employer] about this

## 2018-05-12 DIAGNOSIS — Z98.890 STATUS POST HARDWARE REMOVAL: ICD-10-CM

## 2018-05-12 RX ORDER — OXYCODONE HYDROCHLORIDE 5 MG/1
TABLET ORAL
Qty: 60 TABLET | Refills: 0 | Status: SHIPPED | OUTPATIENT
Start: 2018-05-12 | End: 2018-05-16 | Stop reason: SDUPTHER

## 2018-05-14 ENCOUNTER — PREP FOR PROCEDURE (OUTPATIENT)
Dept: OTHER | Facility: HOSPITAL | Age: 33
End: 2018-05-14

## 2018-05-14 DIAGNOSIS — Z96.641 AFTERCARE FOLLOWING RIGHT HIP JOINT REPLACEMENT SURGERY: Primary | ICD-10-CM

## 2018-05-14 DIAGNOSIS — Z47.1 AFTERCARE FOLLOWING RIGHT HIP JOINT REPLACEMENT SURGERY: Primary | ICD-10-CM

## 2018-05-14 DIAGNOSIS — M12.551 TRAUMATIC ARTHRITIS OF RIGHT HIP: ICD-10-CM

## 2018-05-14 DIAGNOSIS — T84.84XA PAINFUL ORTHOPAEDIC HARDWARE (HCC): Primary | ICD-10-CM

## 2018-05-14 RX ORDER — GABAPENTIN 300 MG/1
300 CAPSULE ORAL ONCE
Status: CANCELLED | OUTPATIENT
Start: 2018-05-14 | End: 2018-05-14

## 2018-05-14 RX ORDER — ASCORBIC ACID 500 MG
500 TABLET ORAL 2 TIMES DAILY
Qty: 60 TABLET | Refills: 0 | Status: SHIPPED | OUTPATIENT
Start: 2018-05-14

## 2018-05-14 RX ORDER — ACETAMINOPHEN 325 MG/1
975 TABLET ORAL ONCE
Status: CANCELLED | OUTPATIENT
Start: 2018-05-14 | End: 2018-05-14

## 2018-05-14 RX ORDER — SODIUM CHLORIDE, SODIUM LACTATE, POTASSIUM CHLORIDE, CALCIUM CHLORIDE 600; 310; 30; 20 MG/100ML; MG/100ML; MG/100ML; MG/100ML
125 INJECTION, SOLUTION INTRAVENOUS CONTINUOUS
Status: CANCELLED | OUTPATIENT
Start: 2018-05-14

## 2018-05-14 RX ORDER — FERROUS SULFATE TAB EC 324 MG (65 MG FE EQUIVALENT) 324 (65 FE) MG
324 TABLET DELAYED RESPONSE ORAL
Qty: 60 TABLET | Refills: 0 | Status: SHIPPED | OUTPATIENT
Start: 2018-05-14

## 2018-05-14 RX ORDER — FOLIC ACID 1 MG/1
1 TABLET ORAL DAILY
Qty: 30 TABLET | Refills: 0 | Status: SHIPPED | OUTPATIENT
Start: 2018-05-14

## 2018-05-14 RX ORDER — CHLORHEXIDINE GLUCONATE 4 G/100ML
SOLUTION TOPICAL DAILY PRN
Status: CANCELLED | OUTPATIENT
Start: 2018-05-14

## 2018-05-15 NOTE — TELEPHONE ENCOUNTER
Patient sees Dr Soo Lara  He is calling to have his Oxycodone 5mg tabs refilled  He is asking if this can be called in on Thursday?

## 2018-05-16 DIAGNOSIS — Z98.890 STATUS POST HARDWARE REMOVAL: ICD-10-CM

## 2018-05-16 RX ORDER — OXYCODONE HYDROCHLORIDE 5 MG/1
TABLET ORAL
Qty: 60 TABLET | Refills: 0 | Status: SHIPPED | OUTPATIENT
Start: 2018-05-17 | End: 2018-05-21 | Stop reason: SDUPTHER

## 2018-05-16 NOTE — TELEPHONE ENCOUNTER
Dr Lisa Martin patient    Queta Lorenzo from [address] [place] [address] called in reference to a preauthorization form for Enoxaparin 40 mg that she faxed a request for Izabela Arce  #[id number] and reference #[id number]    Thank you

## 2018-05-18 NOTE — TELEPHONE ENCOUNTER
Lazaro Caldwell from [facility] called about the prior auth     Call back number: [phone]  Reference number: [id number]

## 2018-05-21 DIAGNOSIS — Z98.890 STATUS POST HARDWARE REMOVAL: ICD-10-CM

## 2018-05-21 RX ORDER — OXYCODONE HYDROCHLORIDE 5 MG/1
TABLET ORAL
Qty: 60 TABLET | Refills: 0 | Status: SHIPPED | OUTPATIENT
Start: 2018-05-21 | End: 2018-05-25 | Stop reason: SDUPTHER

## 2018-05-21 NOTE — TELEPHONE ENCOUNTER
Patient is calling   [phone]  Patient sees Dr Dennis Robert    Patient is calling to get a refill of his oxycodone 5mg tabs  Please call when taken care of

## 2018-05-25 DIAGNOSIS — Z98.890 STATUS POST HARDWARE REMOVAL: ICD-10-CM

## 2018-05-25 RX ORDER — OXYCODONE HYDROCHLORIDE 5 MG/1
TABLET ORAL
Qty: 60 TABLET | Refills: 0 | Status: SHIPPED | OUTPATIENT
Start: 2018-05-25 | End: 2018-06-01 | Stop reason: SDUPTHER

## 2018-06-01 DIAGNOSIS — Z98.890 STATUS POST HARDWARE REMOVAL: ICD-10-CM

## 2018-06-01 RX ORDER — OXYCODONE HYDROCHLORIDE 5 MG/1
TABLET ORAL
Qty: 60 TABLET | Refills: 0 | Status: SHIPPED | OUTPATIENT
Start: 2018-06-01

## 2018-08-08 NOTE — TELEPHONE ENCOUNTER
Dr Juan Shultz patient    Kenton Older from [id number] has been trying to schedule a deposition for [employer] and already faxed over paperwork  Best contact [phone]    Thank you

## 2020-08-06 ENCOUNTER — NURSE TRIAGE (OUTPATIENT)
Dept: OTHER | Facility: OTHER | Age: 35
End: 2020-08-06

## 2020-08-06 DIAGNOSIS — R09.89 SUSPECTED SARS: Primary | ICD-10-CM

## 2020-08-07 DIAGNOSIS — R09.89 SUSPECTED SARS: ICD-10-CM

## 2020-08-07 PROCEDURE — U0003 INFECTIOUS AGENT DETECTION BY NUCLEIC ACID (DNA OR RNA); SEVERE ACUTE RESPIRATORY SYNDROME CORONAVIRUS 2 (SARS-COV-2) (CORONAVIRUS DISEASE [COVID-19]), AMPLIFIED PROBE TECHNIQUE, MAKING USE OF HIGH THROUGHPUT TECHNOLOGIES AS DESCRIBED BY CMS-2020-01-R: HCPCS | Performed by: FAMILY MEDICINE

## 2020-08-07 NOTE — TELEPHONE ENCOUNTER
Reason for Disposition   [1] COVID-19 infection diagnosed or suspected AND [2] mild symptoms (fever, cough) AND [3] no trouble breathing or other complications    Answer Assessment - Initial Assessment Questions  1  COVID-19 DIAGNOSIS: "Who made your Coronavirus (COVID-19) diagnosis?" "Was it confirmed by a positive lab test?" If not diagnosed by a HCP, ask "Are there lots of cases (community spread) where you live?" (See public health department website, if unsure)    * MAJOR community spread: high number of cases; numbers of cases are increasing; many people hospitalized  * MINOR community spread: low number of cases; not increasing; few or no people hospitalized      pcp (non slpg) recommended calling to be tested  2  ONSET: "When did the COVID-19 symptoms start?"       today  3  WORST SYMPTOM: "What is your worst symptom?" (e g , cough, fever, shortness of breath, muscle aches)      Chills and headache  4  COUGH: "Do you have a cough?" If so, ask: "How bad is the cough?"        no  5  FEVER: "Do you have a fever?" If so, ask: "What is your temperature, how was it measured, and when did it start?"      102  6  RESPIRATORY STATUS: "Describe your breathing?" (e g , shortness of breath, wheezing, unable to speak)       Feels ok  7  BETTER-SAME-WORSE: "Are you getting better, staying the same or getting worse compared to yesterday?"  If getting worse, ask, "In what way?"      worse  8  HIGH RISK DISEASE: "Do you have any chronic medical problems?" (e g , asthma, heart or lung disease, weak immune system, etc )      no  9  PREGNANCY: "Is there any chance you are pregnant?" "When was your last menstrual period?"      no  10   OTHER SYMPTOMS: "Do you have any other symptoms?"  (e g , runny nose, headache, sore throat, loss of smell)        Stuffy nose headache chills fever    Protocols used: CORONAVIRUS (COVID-19) - DIAGNOSED OR SUSPECTED-ADULT-

## 2020-08-08 LAB — SARS-COV-2 RNA SPEC QL NAA+PROBE: NOT DETECTED

## 2023-09-12 NOTE — LETTER
April 5, 2018     Patient: Nita Allen   YOB: 1985   Date of Visit: 4/5/2018       To Whom it May Concern:    Nita Allen is under my professional care  He was seen in my office on 4/5/2018  He may return to work on 4/6/18 and may return to work with limitations including NONWEIGHT bearing activities - wheelchair  If you have any questions or concerns, please don't hesitate to call           Sincerely,          Saima Jett MD        CC: No Recipients  used

## 2024-06-18 NOTE — TELEPHONE ENCOUNTER
Dr Bethany Young called because his current prescription for oxycodone will run out on Saturday 5/12/18 and was told to call a few days in advance for a post dated script to  on Friday  He is asking for a refill  He spoke to HERMEL DELOR about this  Best contact #[phone]    Thank you Detail Level: Detailed Add 89807 Cpt? (Important Note: In 2017 The Use Of 98841 Is Being Tracked By Cms To Determine Future Global Period Reimbursement For Global Periods): yes Wound Evaluated By: Aaron Allen M.D.

## (undated) DEVICE — SPONGE PVP SCRUB WING STERILE

## (undated) DEVICE — 6.5MM CANNULATED SCREW 16MM THREAD 80MM: Type: IMPLANTABLE DEVICE | Status: NON-FUNCTIONAL

## (undated) DEVICE — PROXIMATE PLUS MD MULTI-DIRECTIONAL RELEASE SKIN STAPLERS CONTAINS 35 STAINLESS STEEL STAPLES APPROXIMATE CLOSED DIMENSIONS: 6.9MM X 3.9MM WIDE: Brand: PROXIMATE

## (undated) DEVICE — CURITY NON-ADHERENT STRIPS: Brand: CURITY

## (undated) DEVICE — 6617 IOBAN II PATIENT ISOLATION DRAPE 5/BX,4BX/CS: Brand: STERI-DRAPE™ IOBAN™ 2

## (undated) DEVICE — ABDOMINAL PAD: Brand: DERMACEA

## (undated) DEVICE — BLADE ELECTRODE: Brand: EDGE

## (undated) DEVICE — TIBURON SPLIT SHEET: Brand: CONVERTORS

## (undated) DEVICE — SILVER-COATED ANTIMICROBIAL BARRIER DRESSING: Brand: ACTICOAT   4" X 8"

## (undated) DEVICE — GLOVE INDICATOR PI UNDERGLOVE SZ 8.5 BLUE

## (undated) DEVICE — 3M™ TEGADERM™ TRANSPARENT FILM DRESSING FRAME STYLE, 1626W, 4 IN X 4-3/4 IN (10 CM X 12 CM), 50/CT 4CT/CASE: Brand: 3M™ TEGADERM™

## (undated) DEVICE — DRAPE C-ARMOUR

## (undated) DEVICE — GAUZE SPONGES,16 PLY: Brand: CURITY

## (undated) DEVICE — ULTRACLEAN ACCESSORY ELECTRODE 1" (2.54 CM) COATED BLADE: Brand: ULTRACLEAN

## (undated) DEVICE — 3M™ IOBAN™ 2 ANTIMICROBIAL INCISE DRAPE 6650EZ: Brand: IOBAN™ 2

## (undated) DEVICE — GLOVE SRG BIOGEL ORTHOPEDIC 8.5

## (undated) DEVICE — INTENDED FOR TISSUE SEPARATION, AND OTHER PROCEDURES THAT REQUIRE A SHARP SURGICAL BLADE TO PUNCTURE OR CUT.: Brand: BARD-PARKER SAFETY BLADES SIZE 15, STERILE

## (undated) DEVICE — 3.2MM DRILL BIT/QC/145MM

## (undated) DEVICE — SUT VICRYL PLUS 2-0 CTB-1 27 IN VCPB259H

## (undated) DEVICE — 3M™ TEGADERM™ TRANSPARENT FILM DRESSING FRAME STYLE, 1628, 6 IN X 8 IN (15 CM X 20 CM), 10/CT 8CT/CASE: Brand: 3M™ TEGADERM™

## (undated) DEVICE — ALL PURPOSE SPONGES,NONWOVEN, 4 PLY: Brand: CURITY

## (undated) DEVICE — CHLORAPREP HI-LITE 26ML ORANGE

## (undated) DEVICE — STERILE ORIF HIP PACK: Brand: CARDINAL HEALTH

## (undated) DEVICE — INTENDED FOR TISSUE SEPARATION, AND OTHER PROCEDURES THAT REQUIRE A SHARP SURGICAL BLADE TO PUNCTURE OR CUT.: Brand: BARD-PARKER SAFETY BLADES SIZE 10, STERILE

## (undated) DEVICE — DRAPE C-ARM X-RAY

## (undated) DEVICE — GLOVE SRG BIOGEL 8

## (undated) DEVICE — MEDI-VAC YANKAUER SUCTION HANDLE W/STRAIGHT TIP & CONTROL VENT: Brand: CARDINAL HEALTH

## (undated) DEVICE — DRAPE SURGIKIT SADDLE BAG

## (undated) DEVICE — 2.5MM THREADED GUIDE WIRE SPADE POINT 230MM

## (undated) DEVICE — TUBING SUCTION 5MM X 12 FT

## (undated) DEVICE — SUT VICRYL PLUS 0 CTB-1 27 IN VCPB260H

## (undated) DEVICE — SUT VICRYL PLUS 1 CTB-1 36 IN VCPB947H

## (undated) DEVICE — 5.0MM CANNULATED DRILL BIT LARGE QC/300MM

## (undated) DEVICE — STOCKINETTE IMPERVIOUS

## (undated) DEVICE — 2.8MM THREADED GUIDE WIRE- TROCAR POINT 300MM